# Patient Record
Sex: MALE | Race: ASIAN | NOT HISPANIC OR LATINO | Employment: FULL TIME | URBAN - METROPOLITAN AREA
[De-identification: names, ages, dates, MRNs, and addresses within clinical notes are randomized per-mention and may not be internally consistent; named-entity substitution may affect disease eponyms.]

---

## 2020-12-31 ENCOUNTER — HOSPITAL ENCOUNTER (OUTPATIENT)
Dept: CARDIOLOGY | Facility: HOSPITAL | Age: 55
Discharge: HOME | End: 2020-12-31
Attending: INTERNAL MEDICINE

## 2020-12-31 ENCOUNTER — HOSPITAL ENCOUNTER (OUTPATIENT)
Dept: NUCLEAR MEDICINE | Facility: HOSPITAL | Age: 55
Discharge: HOME | End: 2020-12-31
Attending: INTERNAL MEDICINE

## 2020-12-31 DIAGNOSIS — R07.9 CHEST PAIN, UNSPECIFIED TYPE: ICD-10-CM

## 2020-12-31 LAB
CV STRESS CURRENT BP HE: NORMAL
CV STRESS CURRENT HR HE: 102
CV STRESS CURRENT HR HE: 103
CV STRESS CURRENT HR HE: 104
CV STRESS CURRENT HR HE: 107
CV STRESS CURRENT HR HE: 108
CV STRESS CURRENT HR HE: 109
CV STRESS CURRENT HR HE: 113
CV STRESS CURRENT HR HE: 124
CV STRESS CURRENT HR HE: 124
CV STRESS CURRENT HR HE: 126
CV STRESS CURRENT HR HE: 127
CV STRESS CURRENT HR HE: 129
CV STRESS CURRENT HR HE: 129
CV STRESS CURRENT HR HE: 138
CV STRESS CURRENT HR HE: 138
CV STRESS CURRENT HR HE: 143
CV STRESS CURRENT HR HE: 145
CV STRESS CURRENT HR HE: 66
CV STRESS CURRENT HR HE: 76
CV STRESS CURRENT HR HE: 87
CV STRESS CURRENT HR HE: 89
CV STRESS CURRENT HR HE: 89
CV STRESS CURRENT HR HE: 91
CV STRESS CURRENT HR HE: 92
CV STRESS CURRENT HR HE: 92
CV STRESS CURRENT HR HE: 95
CV STRESS CURRENT HR HE: 96
CV STRESS CURRENT HR HE: 99
CV STRESS DEVIATION TIME HE: NORMAL
CV STRESS ECHO PERCENT HR HE: NORMAL
CV STRESS EXERCISE STAGE HE: NORMAL
CV STRESS EXERCISE STAGE REACHED HE: NORMAL
CV STRESS FINAL RESTING BP HE: NORMAL
CV STRESS FINAL RESTING HR HE: 92
CV STRESS MAX HR HE: 146
CV STRESS MAX TREADMILL GRADE HE: 16
CV STRESS MAX TREADMILL SPEED HE: 4.2
CV STRESS PEAK DIA BP HE: NORMAL
CV STRESS PEAK SYS BP HE: NORMAL
CV STRESS PHASE HE: NORMAL
CV STRESS PROTOCOL HE: NORMAL
CV STRESS RESTING PT POSITION HE: NORMAL
CV STRESS RESTING PT POSITION HE: NORMAL
CV STRESS ST DEVIATION AMOUNT HE: NORMAL
CV STRESS ST DEVIATION ELEVATION HE: NORMAL
CV STRESS ST EVELATION AMOUNT HE: NORMAL
CV STRESS TEST TYPE HE: NORMAL
CV STRESS TOTAL STAGE TIME MIN 1 HE: NORMAL
NUC REST EJECTION FRACTION: 50 %
RATE PRESSURE PRODUCT: NORMAL
STRESS ECHO BASELINE DIASTOLIC HE: 86
STRESS ECHO BASELINE HR: 74
STRESS ECHO BASELINE SYSTOLIC BP: 126
STRESS ECHO CALCULATED PERCENT HR: 88 %
STRESS ECHO LAST STRESS DIASTOLIC BP: 86
STRESS ECHO LAST STRESS HR: 143
STRESS ECHO LAST STRESS SYSTOLIC BP: 186
STRESS ECHO POST ESTIMATED WORKLOAD: 11.9
STRESS ECHO POST EXERCISE DUR MIN: 10
STRESS ECHO POST EXERCISE DUR SEC: 11
STRESS ECHO TARGET HR: 166

## 2021-06-16 PROBLEM — I16.9 HYPERTENSIVE CRISIS: Status: ACTIVE | Noted: 2018-01-25

## 2021-06-16 PROBLEM — N28.9 RENAL INSUFFICIENCY: Status: ACTIVE | Noted: 2018-01-25

## 2021-06-16 PROBLEM — N18.30 CKD (CHRONIC KIDNEY DISEASE) STAGE 3, GFR 30-59 ML/MIN (H): Status: ACTIVE | Noted: 2018-01-25

## 2021-06-16 PROBLEM — G45.9 TIA (TRANSIENT ISCHEMIC ATTACK): Status: ACTIVE | Noted: 2018-01-25

## 2021-06-16 PROBLEM — Z86.73 HISTORY OF TIA (TRANSIENT ISCHEMIC ATTACK) AND STROKE: Status: ACTIVE | Noted: 2020-12-26

## 2021-06-16 PROBLEM — R07.9 CHEST PAIN: Status: ACTIVE | Noted: 2020-12-26

## 2023-10-11 ENCOUNTER — APPOINTMENT (OUTPATIENT)
Dept: CT IMAGING | Facility: HOSPITAL | Age: 58
End: 2023-10-11
Attending: STUDENT IN AN ORGANIZED HEALTH CARE EDUCATION/TRAINING PROGRAM
Payer: COMMERCIAL

## 2023-10-11 ENCOUNTER — HOSPITAL ENCOUNTER (INPATIENT)
Facility: HOSPITAL | Age: 58
LOS: 2 days | Discharge: HOME | End: 2023-10-14
Attending: STUDENT IN AN ORGANIZED HEALTH CARE EDUCATION/TRAINING PROGRAM | Admitting: HOSPITALIST
Payer: COMMERCIAL

## 2023-10-11 DIAGNOSIS — I63.9 ACUTE ISCHEMIC STROKE (H): ICD-10-CM

## 2023-10-11 DIAGNOSIS — R53.1 LEFT-SIDED WEAKNESS: ICD-10-CM

## 2023-10-11 LAB
ANION GAP SERPL CALCULATED.3IONS-SCNC: 11 MMOL/L (ref 7–15)
APTT PPP: 27 SECONDS (ref 22–38)
BASO+EOS+MONOS # BLD AUTO: NORMAL 10*3/UL
BASO+EOS+MONOS NFR BLD AUTO: NORMAL %
BASOPHILS # BLD AUTO: 0.1 10E3/UL (ref 0–0.2)
BASOPHILS NFR BLD AUTO: 1 %
BUN SERPL-MCNC: 23.7 MG/DL (ref 6–20)
CALCIUM SERPL-MCNC: 9.8 MG/DL (ref 8.6–10)
CHLORIDE SERPL-SCNC: 102 MMOL/L (ref 98–107)
CREAT SERPL-MCNC: 1.45 MG/DL (ref 0.67–1.17)
DEPRECATED HCO3 PLAS-SCNC: 27 MMOL/L (ref 22–29)
EGFRCR SERPLBLD CKD-EPI 2021: 56 ML/MIN/1.73M2
EOSINOPHIL # BLD AUTO: 0.2 10E3/UL (ref 0–0.7)
EOSINOPHIL NFR BLD AUTO: 2 %
ERYTHROCYTE [DISTWIDTH] IN BLOOD BY AUTOMATED COUNT: 12.6 % (ref 10–15)
GLUCOSE BLDC GLUCOMTR-MCNC: 121 MG/DL (ref 70–99)
GLUCOSE SERPL-MCNC: 118 MG/DL (ref 70–99)
HCT VFR BLD AUTO: 46 % (ref 40–53)
HGB BLD-MCNC: 15.9 G/DL (ref 13.3–17.7)
IMM GRANULOCYTES # BLD: 0 10E3/UL
IMM GRANULOCYTES NFR BLD: 0 %
INR PPP: 0.91 (ref 0.85–1.15)
LYMPHOCYTES # BLD AUTO: 3.1 10E3/UL (ref 0.8–5.3)
LYMPHOCYTES NFR BLD AUTO: 37 %
MCH RBC QN AUTO: 30.2 PG (ref 26.5–33)
MCHC RBC AUTO-ENTMCNC: 34.6 G/DL (ref 31.5–36.5)
MCV RBC AUTO: 87 FL (ref 78–100)
MONOCYTES # BLD AUTO: 0.7 10E3/UL (ref 0–1.3)
MONOCYTES NFR BLD AUTO: 8 %
NEUTROPHILS # BLD AUTO: 4.5 10E3/UL (ref 1.6–8.3)
NEUTROPHILS NFR BLD AUTO: 52 %
NRBC # BLD AUTO: 0 10E3/UL
NRBC BLD AUTO-RTO: 0 /100
PLATELET # BLD AUTO: 214 10E3/UL (ref 150–450)
POTASSIUM SERPL-SCNC: 3.7 MMOL/L (ref 3.4–5.3)
RBC # BLD AUTO: 5.27 10E6/UL (ref 4.4–5.9)
SODIUM SERPL-SCNC: 140 MMOL/L (ref 135–145)
TROPONIN T SERPL HS-MCNC: <6 NG/L
WBC # BLD AUTO: 8.4 10E3/UL (ref 4–11)

## 2023-10-11 PROCEDURE — 3E03317 INTRODUCTION OF OTHER THROMBOLYTIC INTO PERIPHERAL VEIN, PERCUTANEOUS APPROACH: ICD-10-PCS | Performed by: PSYCHIATRY & NEUROLOGY

## 2023-10-11 PROCEDURE — 93005 ELECTROCARDIOGRAM TRACING: CPT | Performed by: STUDENT IN AN ORGANIZED HEALTH CARE EDUCATION/TRAINING PROGRAM

## 2023-10-11 PROCEDURE — 70498 CT ANGIOGRAPHY NECK: CPT

## 2023-10-11 PROCEDURE — 85730 THROMBOPLASTIN TIME PARTIAL: CPT | Performed by: STUDENT IN AN ORGANIZED HEALTH CARE EDUCATION/TRAINING PROGRAM

## 2023-10-11 PROCEDURE — 84100 ASSAY OF PHOSPHORUS: CPT | Performed by: HOSPITALIST

## 2023-10-11 PROCEDURE — 85025 COMPLETE CBC W/AUTO DIFF WBC: CPT | Performed by: STUDENT IN AN ORGANIZED HEALTH CARE EDUCATION/TRAINING PROGRAM

## 2023-10-11 PROCEDURE — 250N000011 HC RX IP 250 OP 636: Mod: JZ | Performed by: STUDENT IN AN ORGANIZED HEALTH CARE EDUCATION/TRAINING PROGRAM

## 2023-10-11 PROCEDURE — 85610 PROTHROMBIN TIME: CPT | Performed by: STUDENT IN AN ORGANIZED HEALTH CARE EDUCATION/TRAINING PROGRAM

## 2023-10-11 PROCEDURE — 83036 HEMOGLOBIN GLYCOSYLATED A1C: CPT | Performed by: HOSPITALIST

## 2023-10-11 PROCEDURE — 36415 COLL VENOUS BLD VENIPUNCTURE: CPT | Performed by: STUDENT IN AN ORGANIZED HEALTH CARE EDUCATION/TRAINING PROGRAM

## 2023-10-11 PROCEDURE — 80061 LIPID PANEL: CPT | Performed by: HOSPITALIST

## 2023-10-11 PROCEDURE — 82962 GLUCOSE BLOOD TEST: CPT

## 2023-10-11 PROCEDURE — 83735 ASSAY OF MAGNESIUM: CPT | Performed by: HOSPITALIST

## 2023-10-11 PROCEDURE — 80048 BASIC METABOLIC PNL TOTAL CA: CPT | Performed by: STUDENT IN AN ORGANIZED HEALTH CARE EDUCATION/TRAINING PROGRAM

## 2023-10-11 PROCEDURE — 84484 ASSAY OF TROPONIN QUANT: CPT | Performed by: STUDENT IN AN ORGANIZED HEALTH CARE EDUCATION/TRAINING PROGRAM

## 2023-10-11 RX ORDER — IOPAMIDOL 755 MG/ML
75 INJECTION, SOLUTION INTRAVASCULAR ONCE
Status: COMPLETED | OUTPATIENT
Start: 2023-10-11 | End: 2023-10-11

## 2023-10-11 RX ADMIN — IOPAMIDOL 75 ML: 755 INJECTION, SOLUTION INTRAVENOUS at 23:34

## 2023-10-11 NOTE — LETTER
[facility]  [address] [address]  Phone: [phone]  Fax: [phone]    October 14, 2023        Fredy Garza  [address] [address]          To whom it may concern:    RE: Fredy Garza    Patients daughter was present 10/12 &10/13 while patient was hospitalized and missed work.      Please contact me for questions or concerns.      Sincerely,    Trudy Hallman MD  [facility] medicine service

## 2023-10-12 ENCOUNTER — APPOINTMENT (OUTPATIENT)
Dept: OCCUPATIONAL THERAPY | Facility: HOSPITAL | Age: 58
End: 2023-10-12
Attending: HOSPITALIST
Payer: COMMERCIAL

## 2023-10-12 ENCOUNTER — APPOINTMENT (OUTPATIENT)
Dept: CARDIOLOGY | Facility: HOSPITAL | Age: 58
End: 2023-10-12
Attending: HOSPITALIST
Payer: COMMERCIAL

## 2023-10-12 ENCOUNTER — APPOINTMENT (OUTPATIENT)
Dept: MRI IMAGING | Facility: HOSPITAL | Age: 58
End: 2023-10-12
Attending: HOSPITALIST
Payer: COMMERCIAL

## 2023-10-12 PROBLEM — I63.9 ACUTE ISCHEMIC STROKE (H): Status: ACTIVE | Noted: 2023-10-12

## 2023-10-12 PROBLEM — R53.1 LEFT-SIDED WEAKNESS: Status: ACTIVE | Noted: 2023-10-12

## 2023-10-12 LAB
ANION GAP SERPL CALCULATED.3IONS-SCNC: 12 MMOL/L (ref 7–15)
APTT PPP: 30 SECONDS (ref 22–38)
BUN SERPL-MCNC: 20.9 MG/DL (ref 6–20)
CALCIUM SERPL-MCNC: 9.6 MG/DL (ref 8.6–10)
CHLORIDE SERPL-SCNC: 104 MMOL/L (ref 98–107)
CHOLEST SERPL-MCNC: 270 MG/DL
CREAT SERPL-MCNC: 1.2 MG/DL (ref 0.67–1.17)
DEPRECATED HCO3 PLAS-SCNC: 23 MMOL/L (ref 22–29)
EGFRCR SERPLBLD CKD-EPI 2021: 71 ML/MIN/1.73M2
ERYTHROCYTE [DISTWIDTH] IN BLOOD BY AUTOMATED COUNT: 12.4 % (ref 10–15)
FLUAV RNA SPEC QL NAA+PROBE: NEGATIVE
FLUBV RNA RESP QL NAA+PROBE: NEGATIVE
GLUCOSE SERPL-MCNC: 110 MG/DL (ref 70–99)
HBA1C MFR BLD: 6.1 %
HCT VFR BLD AUTO: 46 % (ref 40–53)
HDLC SERPL-MCNC: 53 MG/DL
HGB BLD-MCNC: 15.6 G/DL (ref 13.3–17.7)
HOLD SPECIMEN: NORMAL
INR PPP: 0.92 (ref 0.85–1.15)
LDLC SERPL CALC-MCNC: 179 MG/DL
LVEF ECHO: NORMAL
MAGNESIUM SERPL-MCNC: 2.2 MG/DL (ref 1.7–2.3)
MCH RBC QN AUTO: 30.2 PG (ref 26.5–33)
MCHC RBC AUTO-ENTMCNC: 33.9 G/DL (ref 31.5–36.5)
MCV RBC AUTO: 89 FL (ref 78–100)
NONHDLC SERPL-MCNC: 217 MG/DL
PHOSPHATE SERPL-MCNC: 3 MG/DL (ref 2.5–4.5)
PLATELET # BLD AUTO: 202 10E3/UL (ref 150–450)
POTASSIUM SERPL-SCNC: 3.9 MMOL/L (ref 3.4–5.3)
RBC # BLD AUTO: 5.17 10E6/UL (ref 4.4–5.9)
RSV RNA SPEC NAA+PROBE: NEGATIVE
SARS-COV-2 RNA RESP QL NAA+PROBE: NEGATIVE
SODIUM SERPL-SCNC: 139 MMOL/L (ref 135–145)
TRIGL SERPL-MCNC: 189 MG/DL
WBC # BLD AUTO: 6.9 10E3/UL (ref 4–11)

## 2023-10-12 PROCEDURE — 70551 MRI BRAIN STEM W/O DYE: CPT

## 2023-10-12 PROCEDURE — 97535 SELF CARE MNGMENT TRAINING: CPT | Mod: GO

## 2023-10-12 PROCEDURE — 250N000013 HC RX MED GY IP 250 OP 250 PS 637: Performed by: INTERNAL MEDICINE

## 2023-10-12 PROCEDURE — 80048 BASIC METABOLIC PNL TOTAL CA: CPT | Performed by: HOSPITALIST

## 2023-10-12 PROCEDURE — 85027 COMPLETE CBC AUTOMATED: CPT | Performed by: HOSPITALIST

## 2023-10-12 PROCEDURE — 85610 PROTHROMBIN TIME: CPT | Performed by: HOSPITALIST

## 2023-10-12 PROCEDURE — 99207 PR NO BILLABLE SERVICE THIS VISIT: CPT | Performed by: INTERNAL MEDICINE

## 2023-10-12 PROCEDURE — 200N000001 HC R&B ICU

## 2023-10-12 PROCEDURE — 999N000226 HC STATISTIC SLP IP EVAL DEFER

## 2023-10-12 PROCEDURE — 97165 OT EVAL LOW COMPLEX 30 MIN: CPT | Mod: GO

## 2023-10-12 PROCEDURE — 99291 CRITICAL CARE FIRST HOUR: CPT | Mod: G0 | Performed by: PSYCHIATRY & NEUROLOGY

## 2023-10-12 PROCEDURE — 87637 SARSCOV2&INF A&B&RSV AMP PRB: CPT | Performed by: HOSPITALIST

## 2023-10-12 PROCEDURE — 999N000208 ECHOCARDIOGRAM COMPLETE

## 2023-10-12 PROCEDURE — 250N000013 HC RX MED GY IP 250 OP 250 PS 637: Performed by: HOSPITALIST

## 2023-10-12 PROCEDURE — 85730 THROMBOPLASTIN TIME PARTIAL: CPT | Performed by: HOSPITALIST

## 2023-10-12 PROCEDURE — 93306 TTE W/DOPPLER COMPLETE: CPT | Mod: 26 | Performed by: INTERNAL MEDICINE

## 2023-10-12 PROCEDURE — 36415 COLL VENOUS BLD VENIPUNCTURE: CPT | Performed by: HOSPITALIST

## 2023-10-12 PROCEDURE — 250N000011 HC RX IP 250 OP 636: Performed by: HOSPITALIST

## 2023-10-12 PROCEDURE — 99255 IP/OBS CONSLTJ NEW/EST HI 80: CPT | Performed by: PSYCHIATRY & NEUROLOGY

## 2023-10-12 PROCEDURE — 250N000011 HC RX IP 250 OP 636: Performed by: PSYCHIATRY & NEUROLOGY

## 2023-10-12 PROCEDURE — 99222 1ST HOSP IP/OBS MODERATE 55: CPT | Performed by: HOSPITALIST

## 2023-10-12 RX ORDER — FAMOTIDINE 20 MG/1
20 TABLET, FILM COATED ORAL 2 TIMES DAILY
Status: DISCONTINUED | OUTPATIENT
Start: 2023-10-12 | End: 2023-10-14 | Stop reason: HOSPADM

## 2023-10-12 RX ORDER — HYDRALAZINE HYDROCHLORIDE 20 MG/ML
10-20 INJECTION INTRAMUSCULAR; INTRAVENOUS EVERY 30 MIN PRN
Status: DISCONTINUED | OUTPATIENT
Start: 2023-10-12 | End: 2023-10-13

## 2023-10-12 RX ORDER — LABETALOL HYDROCHLORIDE 5 MG/ML
10-20 INJECTION, SOLUTION INTRAVENOUS EVERY 10 MIN PRN
Status: DISCONTINUED | OUTPATIENT
Start: 2023-10-12 | End: 2023-10-13

## 2023-10-12 RX ORDER — SODIUM CHLORIDE AND POTASSIUM CHLORIDE 150; 900 MG/100ML; MG/100ML
INJECTION, SOLUTION INTRAVENOUS CONTINUOUS
Status: DISCONTINUED | OUTPATIENT
Start: 2023-10-12 | End: 2023-10-12

## 2023-10-12 RX ORDER — ACETAMINOPHEN 325 MG/1
650 TABLET ORAL EVERY 4 HOURS PRN
Status: DISCONTINUED | OUTPATIENT
Start: 2023-10-12 | End: 2023-10-14 | Stop reason: HOSPADM

## 2023-10-12 RX ORDER — LIDOCAINE 40 MG/G
CREAM TOPICAL
Status: DISCONTINUED | OUTPATIENT
Start: 2023-10-12 | End: 2023-10-14 | Stop reason: HOSPADM

## 2023-10-12 RX ORDER — LABETALOL HYDROCHLORIDE 5 MG/ML
10 INJECTION, SOLUTION INTRAVENOUS EVERY 10 MIN PRN
Status: DISCONTINUED | OUTPATIENT
Start: 2023-10-12 | End: 2023-10-12

## 2023-10-12 RX ORDER — FAMOTIDINE 40 MG/5ML
20 POWDER, FOR SUSPENSION ORAL 2 TIMES DAILY
Status: DISCONTINUED | OUTPATIENT
Start: 2023-10-12 | End: 2023-10-14 | Stop reason: HOSPADM

## 2023-10-12 RX ORDER — ATORVASTATIN CALCIUM 40 MG/1
80 TABLET, FILM COATED ORAL AT BEDTIME
Status: DISCONTINUED | OUTPATIENT
Start: 2023-10-12 | End: 2023-10-14 | Stop reason: HOSPADM

## 2023-10-12 RX ORDER — ACETAMINOPHEN 650 MG/20.3ML
650 LIQUID ORAL EVERY 4 HOURS PRN
Status: DISCONTINUED | OUTPATIENT
Start: 2023-10-12 | End: 2023-10-14 | Stop reason: HOSPADM

## 2023-10-12 RX ORDER — HYDRALAZINE HYDROCHLORIDE 20 MG/ML
10 INJECTION INTRAMUSCULAR; INTRAVENOUS EVERY 10 MIN PRN
Status: DISCONTINUED | OUTPATIENT
Start: 2023-10-12 | End: 2023-10-12

## 2023-10-12 RX ORDER — SODIUM CHLORIDE 9 MG/ML
INJECTION, SOLUTION INTRAVENOUS CONTINUOUS PRN
Status: DISCONTINUED | OUTPATIENT
Start: 2023-10-12 | End: 2023-10-12

## 2023-10-12 RX ADMIN — HYDRALAZINE HYDROCHLORIDE 20 MG: 20 INJECTION INTRAMUSCULAR; INTRAVENOUS at 18:12

## 2023-10-12 RX ADMIN — NICARDIPINE HYDROCHLORIDE 5 MG/HR: 0.2 INJECTION, SOLUTION INTRAVENOUS at 00:18

## 2023-10-12 RX ADMIN — FAMOTIDINE 20 MG: 20 TABLET ORAL at 20:30

## 2023-10-12 RX ADMIN — FAMOTIDINE 20 MG: 20 TABLET ORAL at 08:29

## 2023-10-12 RX ADMIN — POTASSIUM CHLORIDE AND SODIUM CHLORIDE: 900; 150 INJECTION, SOLUTION INTRAVENOUS at 03:26

## 2023-10-12 RX ADMIN — ATORVASTATIN CALCIUM 80 MG: 40 TABLET, FILM COATED ORAL at 20:30

## 2023-10-12 RX ADMIN — Medication 16 MG: at 00:36

## 2023-10-12 ASSESSMENT — ACTIVITIES OF DAILY LIVING (ADL)
ADLS_ACUITY_SCORE: 24
ADLS_ACUITY_SCORE: 24
ADLS_ACUITY_SCORE: 35
ADLS_ACUITY_SCORE: 24
ADLS_ACUITY_SCORE: 35
DEPENDENT_IADLS:: INDEPENDENT
PREVIOUS_RESPONSIBILITIES: MEAL PREP;HOUSEKEEPING;LAUNDRY;SHOPPING;YARDWORK;MEDICATION MANAGEMENT;FINANCES;DRIVING;WORK
ADLS_ACUITY_SCORE: 35
ADLS_ACUITY_SCORE: 35
ADLS_ACUITY_SCORE: 24

## 2023-10-12 NOTE — PLAN OF CARE
Speech Therapy    Orders received. Chart reviewed and patient discussed with care team.?Writer briefly met with patient at bedside. His speech is clear (i.e., no dysarthria noted) and he seems to be functioning at his cognitive-linguistic baseline. He also passed the nursing dysphagia screen and has been tolerating a diet of Regular textures and thin liquids without clinical s/sx of aspiration. No skilled Speech Therapy needs at this time. Will defer clinical swallow evaluation and speech/language evaluation and complete current order. Please re-consult if new concerns arise.

## 2023-10-12 NOTE — ED TRIAGE NOTES
Triage Assessment (Adult)       Row Name 10/11/23 2319 10/11/23 [id number]       Triage Assessment    Airway WDL -- WDL       Respiratory WDL    Respiratory WDL -- WDL       Skin Circulation/Temperature WDL    Skin Circulation/Temperature WDL -- WDL       Cardiac WDL    Cardiac WDL -- WDL       Peripheral/Neurovascular WDL    Peripheral Neurovascular WDL -- WDL       Cognitive/Neuro/Behavioral WDL    Cognitive/Neuro/Behavioral WDL -- WDL       [place] Coma Scale    Best Eye Response 4-->(E4) spontaneous --    Best Motor Response 6-->(M6) obeys commands --    Best Verbal Response 5-->(V5) oriented --    [place] Coma Scale Score 15 --

## 2023-10-12 NOTE — ED NOTES
[facility]    Stroke Consult Note    Reason for Consult: Stroke Code     Chief Complaint: Stroke Symptoms      HPI  Fredy Garza is a 58 year old male with a history of remote TIA on ASA, CKD, HTN on lisinopril.     At 8:30pm on 10/11 pt had new onset feeling of weakness in his legs; he'd been up and walking just prior to the onset of weakness, LSW 8:30PM. Came into ER around 11:30pm. On ER MD exam noted to have predominantly L sided arm weakness, stroke code called. NCCT with chronic/age indeterminate small strokes, CTA without large vessel occlusion.    Saw patient on camera. Had left face/arm/leg weakness, did not score on NIHSS for LUE/LLE drift but the left arm/hand and left leg are clearly weaker than the right side on formal testing of function & patient had difficulty standing up from bed on his own due to LLE weakness. Discussed potential risks & benefits of IV TNK as well as inclusion/exclusion criteria with patient & wife at bedside, answered all questions about potential risks (extended risk/benefit discussion). Patient felt symptoms were disabling and agreed to IV TNK.    Blood pressure was very high (SBP >200, DBP >110), cardene gtt started before TNK administration. Blood pressure at time of TNK admin is 152/94 (not clear if this was updated in computer prior to TNK admin but this was witnessed by this writer on tele/confirmed with ER team). TNK in at 00:37    Imaging Findings  HEAD CT:  1.  Multifocal chronic lacunar infarcts throughout the bilateral basal ganglia, deep white matter, and right thalamus. Age-indeterminate infarct in the right corona radiata, new from the prior MRI.  2.  No acute intracranial hemorrhage.  3.  Generalized volume loss and sequelae of chronic microvascular ischemic disease, as described.     HEAD CTA:   1.  Intracranial atherosclerosis, without evidence of a proximal arterial occlusion.     NECK CTA:  1.  Nonflow limiting atherosclerotic  "plaque at the carotid bifurcations.  2.  Dilatation of the ascending aorta, which may reflect an aortic aneurysm. Findings can be further evaluated with CTA of the chest.    Intravenous Thrombolysis  Risks (including potential for bleeding and death), benefits, and alternatives to thrombolytic therapy were discussed with Patient and Family. Prior to tenecteplase (TNK) administration, the following issues were addressed:   - hypertension requiring aggressive control with IV medications  - other eligibility reason: extended discussion of risk/benefit with patient/wife  - in-hospital time delay--waited on phone hold total 7 mins until transferred to ER MD to get clinical story, RN not in room during initial part of tele exam/left room repeatedly which delayed initial exam as well as not in room to receive IV TNK, bed weight not initially obtained needed to be obtained prior to ordering IV TNK    Endovascular Treatment  Not initiated due to absence of proximal vessel occlusion    Impression   Acute ischemic stroke of R hemisphere due to undetermined etiology     Recommendations  - Use orderset: \"Ischemic Stroke Post-Thrombolytics/Thrombectomy ICU Admission\"  - Neurochecks and vital signs per post-thrombolytic orders and monitor closely for any evidence of CNS hemorrhage, bleeding, or orolingual angioedema  - Goal BP <180 / 105  - Hold all antithrombotic and anticoagulant medications for 24 hrs post-thrombolytic  - Hold pharmacologic VTE prophylaxis for 24 hrs post-thrombolytic  - Statin:  atorvastatin 80mg daily adjust for LDL <70 goal  - Repeat Head CT 24 hrs post-thrombolytic  - MRI Brain with and without contrast  - TTE (with Bubble Study if age 60 yrs or less)  - Telemetry, EKG  - Bedside Glucose Monitoring  - A1c, Lipid Panel, Troponin x 3  - PT/OT/SLP  - Stroke Education  - Euthermia, Euglycemia    Patient Follow-up     - final recommendation pending work-up    Thank you for this consult. patient should be " "admitted to [facility] ICU for post-TNK monitoring with Gen Neuro service following. if no [facility] ICU bed he should be transferred to [facility] or [facility] ICU for monitoring and stroke team can be consulted     For additional questions overnight while patient is still in ER please page Dr Johnston (listed as stroke provider covering system in below amcom link)     Ally Hoskins MD  Vascular Neurology    To page me or covering stroke neurology team member, click here: [facility]  Choose \"On Call\" tab at top, then select \"NEUROLOGY/ALL SITES\" from middle drop-down box, press Enter, then look for \"stroke\" or \"telestroke\" for your site.    ______________________________________________________    # CKD, Stage 2 (GFR 60-89): Will monitor and treat as appropriate  - last Cr =  1.45 mg/dL (Ref range: 0.67 - 1.17 mg/dL)  - last GFR = 56 mL/min/1.73m2 (Ref range: >60 mL/min/1.73m2)    Past Medical History   Past Medical History:   Diagnosis Date     HTN (hypertension)      Hyperlipemia      Past Surgical History   Past Surgical History:   Procedure Laterality Date     OTHER SURGICAL HISTORY      no previous     Medications   Home Meds  Prior to Admission medications    Medication Sig Start Date End Date Taking? Authorizing Provider   aspirin 81 MG EC tablet [ASPIRIN 81 MG EC TABLET] Take 1 tablet (81 mg total) by mouth daily. 1/27/18   Randal Hammer DO   atorvastatin (LIPITOR) 80 MG tablet [ATORVASTATIN (LIPITOR) 80 MG TABLET] Take 80 mg by mouth at bedtime. 12/26/20   Provider, Historical   lisinopriL (PRINIVIL,ZESTRIL) 30 MG tablet [LISINOPRIL (PRINIVIL,ZESTRIL) 30 MG TABLET] Take 30 mg by mouth daily. 12/26/20   Provider, Historical   nitroglycerin (NITROSTAT) 0.4 MG SL tablet [NITROGLYCERIN (NITROSTAT) 0.4 MG SL TABLET] Place 0.4 mg under the tongue every 5 (five) minutes as needed for chest pain. 12/26/20   Provider, Historical       Scheduled Meds      Infusion Meds    niCARdipine 7.5 mg/hr (10/12/23 0024)     sodium " chloride         PRN Meds  labetalol **OR** hydrALAZINE, niCARdipine, sodium chloride    Allergies   No Known Allergies  Family History   Family History   Problem Relation Age of Onset     Stomach Cancer Mother      Social History   Social History     Tobacco Use     Smoking status: Never   Substance Use Topics     Alcohol use: No       Review of Systems   The 10 point Review of Systems is negative other than noted in the HPI or here.        PHYSICAL EXAMINATION  Temp:  [97.7  F (36.5  C)] 97.7  F (36.5  C)  Pulse:  [70-91] 91  Resp:  [20-28] 28  BP: (150-221)/() 152/94  SpO2:  [94 %-98 %] 98 %     Neuro Exam  Mental Status:  alert, oriented x 3, follows commands, speech clear and fluent, naming and repetition normal  Cranial Nerves:  visual fields intact (tested by nurse), EOMI with normal smooth pursuit, facial sensation intact and symmetric (tested by nurse), hearing not formally tested but intact to conversation, no dysarthria, shoulder shrug equal bilaterally, tongue protrusion midline, mild L nasolabial fold flattening wife says smile looks the same as normal  Motor:  no abnormal movements, able to move all limbs antigravity spontaneously with no signs of hemiparesis observed, no pronator drift, L arm & leg do not drift but are clearly weaker to confrontational testing  Reflexes:  unable to test (telestroke)  Sensory:  light touch sensation intact and symmetric throughout upper and lower extremities (assessed by nurse), no extinction on double simultaneous stimulation (assessed by nurse)  Coordination:  mild slowing of LUE/LLE to rapid movements, mild LUE dysmetria  Station/Gait:  difficulty standing from bed needs to hold on to support due to LLE weakness    Dysphagia Screen  Per Nursing    Stroke Scales    NIHSS  1a. Level of Consciousness 0-->Alert, keenly responsive   1b. LOC Questions 0-->Answers both questions correctly   1c. LOC Commands 0-->Performs both tasks correctly   2.   Best Gaze  "0-->Normal   3.   Visual 0-->No visual loss   4.   Facial Palsy 1-->Minor paralysis (flattened nasolabial fold, asymmetry on smiling)   5a. Motor Arm, Left 0-->No drift, limb holds 90 (or 45) degrees for full 10 secs   5b. Motor Arm, Right 0-->No drift, limb holds 90 (or 45) degrees for full 10 secs   6a. Motor Leg, Left 0-->No drift, leg holds 30 degree position for full 5 secs   6b. Motor Leg, right 0-->No drift, leg holds 30 degree position for full 5 secs   7.   Limb Ataxia 1-->Present in one limb   8.   Sensory 0-->Normal, no sensory loss   9.   Best Language 0-->No aphasia, normal   10. Dysarthria 0-->Normal   11. Extinction and Inattention  0-->No abnormality   Total 2 (10/11/23 2351)       Modified Edwin Score (Pre-morbid)  0-No deficits    Imaging  I personally reviewed all imaging; relevant findings per HPI.     Lab Results Data   CBC  Recent Labs   Lab 10/11/23  2325   WBC 8.4   RBC 5.27   HGB 15.9   HCT 46.0        Basic Metabolic Panel    Recent Labs   Lab 10/11/23  2325 10/11/23  2316     --    POTASSIUM 3.7  --    CHLORIDE 102  --    CO2 27  --    BUN 23.7*  --    CR 1.45*  --    * 121*   SHERRON 9.8  --      Liver Panel  No results for input(s): \"PROTTOTAL\", \"ALBUMIN\", \"BILITOTAL\", \"ALKPHOS\", \"AST\", \"ALT\", \"BILIDIRECT\" in the last 168 hours.  INR    Recent Labs   Lab Test 10/11/23  2325 12/26/20  0755   INR 0.91 0.93      Lipid Profile    Recent Labs   Lab Test 01/26/18  0541   CHOL 223*   HDL 47   *   TRIG 112     A1C  No lab results found.  Troponin    Recent Labs   Lab 10/11/23  2325   CTROPT <6          Stroke Code Data Data   Stroke Code Data  (for stroke code with tele)  Stroke code activated 10/11/23   5021 (Dr Horn sent page to me to be answered as he was on another call. Per the picture he sent me the page went out to him on Oct 12th at 09:49 ?Dolores time. He texted me at 11:37pm central time, I answered the page at 11:39 central time)   First stroke provider response " 10/11/23   2351   Video start time 10/11/23   2348   Video end time 10/12/23   0040   Last known normal 10/11/23   2030   Time of discovery  (or onset of symptoms)  10/11/23   2030   Head CT read by Stroke Neuro Dr/Provider 10/11/23   2345   Was stroke code de-escalated? No               Telestroke Service Details  Type of service telemedicine diagnostic assessment of acute neurological changes   Reason telemedicine is appropriate patient requires assessment with a specialist for diagnosis and treatment of neurological symptoms   Mode of transmission secure interactive audio and video communication per Steve   Originating site (patient location) [facility]    Distant site (provider location) Provider remote site       I personally examined and evaluated the patient today. At the time of my evaluation and management the patient was in critical condition today due to new onset neurologic symptoms requiring emergent evaluation for acute stroke therapy including IV TNK administration. I personally managed all acute stroke workup and management as above. Key decisions made today included IV TNK administration for presumed acute stroke, initiation of IV cardene gtt for hypertension management before TNK admin. I spent a total of 70 minutes providing critical care services, evaluating the patient, directing care and reviewing laboratory values and radiologic reports.

## 2023-10-12 NOTE — H&P
[facility]    History and Physical - Hospitalist Service       Date of Admission:  10/11/2023    Assessment & Plan      Fredy Garza is a 58 year old male admitted on 10/11/2023. He came to the emergency department for evaluation of left-sided weakness and numbness    #Acute cerebral ischemia  -Status post TNK, symptoms are resolving  -No antiplatelets or anticoagulation x24 hours after TNK  -Check lipid panel, hemoglobin A1c  -Keep systolic blood pressure less than 180, diastolic less than 105  -Telemetry monitoring for arrhythmias, if unable to detect arrhythmia will need 30-day event monitor at discharge  -Echocardiogram to evaluate structure and function for cardioembolic etiology  -PT/OT/SLP evaluation and treatment  -Neurology consult    #Hypertensive emergency  -Noncompliance with blood pressure medications  -Improved on nicardipine drip  -IV labetalol/hydralazine as needed for above parameters    #Hyperglycemia  -Check hemoglobin A1c  -Monitor glucose  -Avoid dextrose    #Hyperlipidemia  -Check lipid panel  -Start statins prior to discharge    #Chronic kidney disease stage IIIa  -Avoid nephrotoxins  -Monitor renal function        Diet: NPO for Medical/Clinical Reasons Except for: Meds    DVT Prophylaxis: Pneumatic Compression Devices  Aquino Catheter: Not present  Lines: None     Cardiac Monitoring: None  Code Status:  Full code        Disposition Plan      Expected Discharge Date: 10/14/2023                  Floyd Spencer MD  Hospitalist Service  [facility]  Securely message with [facility] (more info)  Text page via [facility] Paging/Directory     ______________________________________________________________________    Chief Complaint   Left-sided weakness and numbness    History is obtained from the patient, electronic health record, and emergency department physician    History of Present Illness   Fredy Garza is a 58 year old male who came to the emergency  department for evaluation of left-sided weakness and numbness.  Past medical history of hypertension, hyperlipidemia, CKD.  Patient does not take prescribed medications on a regular basis.  He denies tobacco, alcohol or drugs.  On 10/11/2023 around 8:30 PM he noticed new onset of left-sided weakness and numbness.  The symptoms did not change and came to the ED.  He denies recent falls, head trauma, lightheadedness, fevers, chills, sore throat, cough, chest pain, shortness of breath or palpitations.  Tier 1 stroke code done in the ED, patient was treated with TNK.  His symptoms are mostly resolved at this time.      Past Medical History    Past Medical History:   Diagnosis Date    HTN (hypertension)     Hyperlipemia        Past Surgical History   Past Surgical History:   Procedure Laterality Date    OTHER SURGICAL HISTORY      no previous       Prior to Admission Medications   Prior to Admission Medications   Prescriptions Last Dose Informant Patient Reported? Taking?   aspirin 81 MG EC tablet   No No   Sig: [ASPIRIN 81 MG EC TABLET] Take 1 tablet (81 mg total) by mouth daily.   atorvastatin (LIPITOR) 80 MG tablet   Yes No   Sig: [ATORVASTATIN (LIPITOR) 80 MG TABLET] Take 80 mg by mouth at bedtime.   lisinopriL (PRINIVIL,ZESTRIL) 30 MG tablet   Yes No   Sig: [LISINOPRIL (PRINIVIL,ZESTRIL) 30 MG TABLET] Take 30 mg by mouth daily.   nitroglycerin (NITROSTAT) 0.4 MG SL tablet   Yes No   Sig: [NITROGLYCERIN (NITROSTAT) 0.4 MG SL TABLET] Place 0.4 mg under the tongue every 5 (five) minutes as needed for chest pain.      Facility-Administered Medications: None           Physical Exam   Vital Signs: Temp: 97.7  F (36.5  C) Temp src: Oral BP: 120/75 Pulse: 78   Resp: 30 SpO2: 91 % O2 Device: None (Room air)    Weight: 141 lbs 8 oz    Constitutional: awake and alert  Eyes: pupils equal, round and reactive to light and extra-ocular muscles intact  ENT: normocepalic, without obvious abnormality, oral pharynx with moist mucus  membranes  Respiratory: no increased work of breathing and good air exchange  Cardiovascular: regular rate and rhythm, normal S1 and S2, no murmur noted, and carotids without bruits bilaterally  GI: normal bowel sounds and soft  Skin: no bruising or bleeding  Musculoskeletal: no lower extremity pitting edema present  Neurologic: Mental Status Exam:  Level of Alertness:   awake  Orientation:   person, place, time  Cranial Nerves:  cranial nerves II-XII are grossly intact  Motor Exam:  moves all extremities well and symmetrically, very subtle left upper extremity weakness when compared to the right (patient is right-handed)    Medical Decision Making       55 MINUTES SPENT BY ME on the date of service doing chart review, history, exam, documentation & further activities per the note.  MANAGEMENT DISCUSSED with the following over the past 24 hours: Patient and wife       Data     I have personally reviewed the following data over the past 24 hrs:    8.4  \   15.9   / 214     140 102 23.7 (H) /  118 (H)   3.7 27 1.45 (H) \     Trop: <6 BNP: N/A     INR:  0.91 PTT:  27   D-dimer:  N/A Fibrinogen:  N/A       Imaging results reviewed over the past 24 hrs:   Recent Results (from the past 24 hour(s))   CTA Head Neck with Contrast    Narrative    EXAM: CTA HEAD NECK W CONTRAST  LOCATION: [facility]  DATE: 10/11/2023    INDICATION: Neuro deficit. Left arm and bilateral lower extremity weakness.  COMPARISON: Brain MRI dated 01/25/2018  CONTRAST: Isovue 370 75 ml.  TECHNIQUE: Head and neck CT angiogram with IV contrast. Noncontrast head CT followed by axial helical CT images of the head and neck vessels obtained during the arterial phase of intravenous contrast administration. Axial 2D reconstructed images and   multiplanar 3D MIP reconstructed images of the head and neck vessels were performed by the technologist. Dose reduction techniques were used. All stenosis measurements made according to NASCET  criteria unless otherwise specified.    FINDINGS:   NONCONTRAST HEAD CT:   INTRACRANIAL CONTENTS: No intracranial hemorrhage, extraaxial collection, or mass effect.  Multifocal chronic infarcts in the bilateral basal ganglia, right thalamus, and deep white matter. The right periventricular white matter is new, but is   well-defined and favored to be chronic. There is an age-indeterminate lacunar infarct in the right corona radiata that is new from the prior MRI. Moderate presumed chronic small vessel ischemic changes. Mild generalized volume loss. No hydrocephalus.     VISUALIZED ORBITS/SINUSES/MASTOIDS: No intraorbital abnormality. No paranasal sinus mucosal disease. No middle ear or mastoid effusion.    BONES/SOFT TISSUES: No acute abnormality.    HEAD CTA:  ANTERIOR CIRCULATION: Diffuse atherosclerotic calcification in the carotid siphons without flow-limiting stenosis. Mild stenoses throughout the branches of the anterior middle cerebral arteries, consistent with atherosclerosis. No evidence of a proximal   arterial occlusion or saccular aneurysm. Fetal origin of the right posterior cerebral artery from the anterior circulation.    POSTERIOR CIRCULATION: Multifocal atherosclerotic plaque in the vertebral arteries resulting in mild stenosis. The basilar artery is patent. No proximal arterial occlusion. Mild stenoses of the bilateral PCA branches, consistent with atherosclerosis.   Balanced vertebral arteries supply a normal basilar artery.     DURAL VENOUS SINUSES: Expected enhancement of the major dural venous sinuses.    NECK CTA:  RIGHT CAROTID: Atherosclerotic plaque results in less than 50% stenosis in the right ICA. No dissection.    LEFT CAROTID: Atherosclerotic plaque results in less than 50% stenosis in the left ICA. No dissection.    VERTEBRAL ARTERIES: No focal stenosis or dissection. Balanced vertebral arteries.    AORTIC ARCH: Dilatation of the ascending aorta measuring 3.8 cm in diameter, suggestive  of an aortic aneurysm. No flow-limiting stenosis of the proximal great vessels. Mild degenerative changes of the cervical spine without high-grade spinal canal   stenosis. Visualized lung apices are clear.    NONVASCULAR STRUCTURES: Unremarkable.      Impression    IMPRESSION:   HEAD CT:  1.  Multifocal chronic lacunar infarcts throughout the bilateral basal ganglia, deep white matter, and right thalamus. Age-indeterminate infarct in the right corona radiata, new from the prior MRI.  2.  No acute intracranial hemorrhage.  3.  Generalized volume loss and sequelae of chronic microvascular ischemic disease, as described.    HEAD CTA:   1.  Intracranial atherosclerosis, without evidence of a proximal arterial occlusion.    NECK CTA:  1.  Nonflow limiting atherosclerotic plaque at the carotid bifurcations.  2.  Dilatation of the ascending aorta, which may reflect an aortic aneurysm. Findings can be further evaluated with CTA of the chest.    Findings were discussed with Dr. Queen at 2330 hours on 10/11/2023.

## 2023-10-12 NOTE — PROGRESS NOTES
10/12/23 0845   Appointment Info   Signing Clinician's Name / Credentials (OT) Glenda Tripp, OTR/L      Language pt declined    Living Environment   People in Home spouse;child(amelia), adult   Current Living Arrangements house   Home Accessibility stairs to enter home;stairs within home   Number of Stairs, Main Entrance 2   Stair Railings, Main Entrance railings safe and in good condition   Number of Stairs, Within Home, Primary greater than 10 stairs   Stair Railings, Within Home, Primary railings safe and in good condition   Transportation Anticipated car, drives self;family or friend will provide   Living Environment Comments 2 level home.  adult son lives in basement.   Self-Care   Usual Activity Tolerance excellent   Current Activity Tolerance good   Regular Exercise No   Equipment Currently Used at Home none   Fall history within last six months no   Activity/Exercise/Self-Care Comment independent with self cares and moblity without device.  Pt had been exercising regularly in the past but hasn't as of late   Instrumental Activities of Daily Living (IADL)   Previous Responsibilities meal prep;housekeeping;laundry;shopping;yardwork;medication management;finances;driving;work   IADL Comments shares responsibilities with family   General Information   Onset of Illness/Injury or Date of Surgery 10/11/23   Referring Physician Floyd Spencer   Patient/Family Therapy Goal Statement (OT) pt would like to return home and change lifestyle habbits to improve health   Additional Occupational Profile Info/Pertinent History of Current Problem 58 year old male admitted on 10/11/2023. He came to the emergency department for evaluation of left-sided weakness and numbness    Acute cerebral ischemia   Cognitive Status Examination   Affect/Mental Status (Cognitive) WNL   Follows Commands WNL   Visual Perception   Visual Impairment/Limitations WNL   Sensory   Sensory Comments pt reports having some L  side numbness and weakness but has resolved and is WNL   Strength Comprehensive (MMT)   Comment, General Manual Muscle Testing (MMT) Assessment WNL   Bed Mobility   Bed Mobility No deficits identified   Transfers   Transfers bed-chair transfer;toilet transfer   Transfer Skill: Bed to Chair/Chair to Bed   Bed-Chair [place] (Transfers) independent   Toilet Transfer   Type (Toilet Transfer) sit-stand   [place] Level (Toilet Transfer) independent   Balance   Balance Assessment no deficits identified   Activities of Daily Living   BADL Assessment/Intervention lower body dressing;grooming;clothing fastener management   Lower Body Dressing Assessment/Training   [place] Level (Lower Body Dressing) doff;don;shoes/slippers;independent   Clothes Fastener Management   [place] Level (Clothes Fastener Management) clothes fastener management;shoelaces;independent   Grooming Assessment/Training   [place] Level (Grooming) wash face, hands;independent   Clinical Impression   Criteria for Skilled Therapeutic Interventions Met (OT) Yes, treatment indicated   OT Diagnosis reduced functional moblity and stroke awareness education needs   Influenced by the following impairments CVA   OT Problem List-Impairments impacting ADL problems related to;mobility   Assessment of Occupational Performance 1-3 Performance Deficits   Identified Performance Deficits stroke education needs.  stroke managment and progression   Planned Therapy Interventions (OT) home program guidelines;progressive activity/exercise;risk factor education   Clinical Decision Making Complexity (OT) problem focused assessment/low complexity   Therapy Frequency (OT) One time eval and treatment   Risk & Benefits of therapy have been explained evaluation/treatment results reviewed;care plan/treatment goals reviewed;risks/benefits reviewed;participants voiced agreement with care plan;participants included;patient   OT Total Evaluation Time   OT Eval, Low  Complexity Minutes (11593) 10   OT Goals   OT Predicted Duration/Target Date for Goal Attainment 10/12/23   OT Goals OT Goal 1   OT: Goal 1 pt will participate in stroke awareness education and acknowledge risk factors for managment of CVA/   OT Discharge Planning   OT Plan stroke education.  fall risk reduction and progression post stroke   OT Discharge Recommendation (DC Rec) home   OT Rationale for DC Rec pt is close to baseline and has good support at home   OT Brief overview of current status independent with moblity.  very minimal residual affects with L side post CVA   Total Session Time   Total Session Time (sum of timed and untimed services) 10

## 2023-10-12 NOTE — ED PROVIDER NOTES
EMERGENCY DEPARTMENT ENCOUNTER       ED Course & Medical Decision Making     10:50 PM I met with the patient.   12:15 AM stroke neuro contacted. Systolic 220. Nicardipine ordered with goal systolic less than 180.  12:18 AM I talked with the stroke neurology and they confirmed goal systolic under 180/105.   12:29 AM I talked to intensivist,  regarding patient.   12:59 AM I talked to hospitalist Dr. Spencer regarding patient.  1:10 AM Patients BP now in the 120s, at 2.5 of Nicardipine, will hold for now     Final Impression  58 year old male presents for evaluation of bilateral leg weakness and numbness that started at about 8:40 PM today.  Patient reports he was in his normal state of health throughout the day today, was watching some YouTube videos and then all of a sudden noticed that his legs felt numb and weak, tried to stand and had difficulty standing due to lower extremity weakness.  Wife had to help him into the car to bring him to the ED.  Patient initially evaluated in triage as possible stroke code, was sitting in wheelchair at the time of evaluation.  Patient does have notable left upper extremity weakness on exam, may have very mild left lower extremity weakness with plantarflexion, though when attempting to stand has more pronounced weakness and is unable to support self without significant assistance.  Patient denies history of strokes.  Patient hypertensive in the 180s-190s at triage, later in the 220s but at time of rooming.  Patient's family member at bedside reports that he frequently does not take her misses his blood pressure medications, not uncommon for him to be fairly hypertensive.  Initial CT does not show any bleed, though does show several chronic lacunar infarcts indicative that patient has had several strokes in the past.  Stroke neurology evaluated patient on camera, after shared decision making with family they consented patient for tenecteplase and tenecteplase was given after  patient's blood pressure was treated with nicardipine.  Initially was on 2.5 mg nicardipine, though pressure dropped to 120 systolic thus nicardipine was paused.  Discussed with intensivist as well as hospitalist.  Will be admitted here at [facility].    Prior to making a final disposition on this patient the results of patient's tests and other diagnostic studies were discussed with the patient. All questions were answered. Patient expressed understanding of the plan and was amenable to it.    Medical Decision Making    History:  Supplemental history from: Wife    Work Up:  Chart documentation includes differential considered and any EKGs or imaging independently interpreted by provider, where specified.    External consultation:  Discussion of management with another provider: Hospitalist, intensivist, stroke neurology    Complicating factors:  Care impacted by chronic illness: Hyperlipidemia  Care affected by social determinants of health: N/A    Disposition considerations: Admit.      Medications   sodium chloride 0.9 % infusion (has no administration in time range)   labetalol (NORMODYNE/TRANDATE) injection 10 mg (has no administration in time range)     Or   hydrALAZINE (APRESOLINE) injection 10 mg (has no administration in time range)   niCARdipine 40 mg in 200 mL NS (CARDENE) infusion (5 mg/hr Intravenous Rate/Dose Change 10/12/23 0047)   iopamidol (ISOVUE-370) solution 75 mL (75 mLs Intravenous $Given 10/11/23 2334)   tenecteplase (TNKase) injection 16 mg (16 mg Intravenous $Given 10/12/23 0036)     New Prescriptions    No medications on file       Final Impression     1. Acute ischemic stroke (H)    2. Left-sided weakness        Critical Care     Performed by: Rg Queen MD  Authorized by: Rg Queen MD                   Total critical care time: 40 minutes  Critical care was necessary to treat or prevent imminent or life-threatening deterioration of the following conditions: Acute  ischemic stroke quiring tenecteplase, hypertensive urgency requiring Cardene drip  Critical care was time spent personally by me on the following activities: development of treatment plan with patient or surrogate, discussions with consultants, examination of patient, evaluation of patient's response to treatment, obtaining history from patient or surrogate, ordering and performing treatments and interventions, ordering and review of laboratory studies, ordering and review of radiographic studies, re-evaluation of patient's condition and monitoring for potential decompensation.  Critical care time was exclusive of separately billable procedures and treating other patients.    Chief Complaint     Chief Complaint   Patient presents with    Stroke Symptoms     Around 845pm pt felt his left leg and left arms are weak and c/o numbness. Noted left facial droop. Blood sugar was 121 and Dr. Coe in Triage. Pt is Tier 1 stoke code and was called.    SAEED Garza is a 58 year old male who presents for evaluation of stroke symptoms.     The patient reports left leg and bilateral extremities weakness around 8:40 PM today. The patient states she was watching you tube when he noticed his legs and arms were weak. He endorses numbness with tingling in his left arm and leg, and noted left facial droop. Patient's wife states he is stubborn.He was weak and needed assistance to get into the car, she helped assist him . No history of stroke or heart attacks. Denies any headache, nausea, vomiting or visual changes. No other complaints right now.     I, Saida Cintron am serving as a scribe to document services personally performed by Dr. Rg Queen MD, based on my observation and the provider's statements to me. I, Dr. Rg Queen MD attest that Saida Cintron is acting in a scribe capacity, has observed my performance of the services and has documented them in accordance with my direction.    Physical Exam     /70    Pulse 83   Temp 97.7  F (36.5  C) (Oral)   Resp 26   Wt 64.2 kg (141 lb 8 oz)   SpO2 90%   BMI 25.88 kg/m    Constitutional: Awake, alert, in no acute distress.  Head: Normocephalic, atraumatic.  ENT: Mucous membranes moist.  Eyes: Conjunctiva normal. PERRL.  Respiratory: Respirations even, unlabored, in no acute respiratory distress.  Cardiovascular: Regular rate and rhythm. Good peripheral perfusion.  GI: Abdomen soft, non-tender.  Musculoskeletal: Moves all 4 extremities continuously.  Integument: Warm, dry.  Neurologic: Alert & oriented x 3. Normal speech, no obvious slurring or dysarthria. Grossly normal sensory function.  Notable left upper extremity weakness, still able to  and flex at the elbow though was moderately weak.  May have just trace weakness of plantarflexion of the left lower extremity.  Right strength grossly normal.    Psychiatric: Normal mood      The patient has stroke symptoms:         ED Stroke specific documentation           NIHSS PDF     Patient last known well time: 8:40 PM    Thrombolytics:   Stroke neurology had shared decision making conversation with family and they ultimately elected for tenecteplase    If treating with thrombolytics: Ensure SBP<180 and DBP<105 prior to treatment with thrombolytics.  Administering thrombolytics after treatment with IV labetalol, hydralazine, or nicardipine is reasonable once BP control is established.    Endovascular Retrieval:  Not initiated due to absence of proximal vessel occlusion    National [employer] Stroke Scale (Baseline)  Time Performed: 10:50 PM     Score    Level of consciousness: (0)   Alert, keenly responsive    LOC questions: (0)   Answers both questions correctly    LOC commands: (0)   Performs both tasks correctly    Best gaze: (0)   Normal    Visual: (0)   No visual loss    Facial palsy: (0)   Normal symmetrical movements    Motor arm (left): (1)   Drift    Motor arm (right): (0)   No drift    Motor leg (left):  (0)   No drift    Motor leg (right): (0)   No drift    Limb ataxia: (0)   Absent    Sensory: (0)   Normal- no sensory loss    Best language: (0)   Normal- no aphasia    Dysarthria: (0)   Normal    Extinction and inattention: (0)   No abnormality        Total Score:  1      Stroke Mimics were considered (including migraine headache, seizure disorder, hypoglycemia (or hyperglycemia), head or spinal trauma, CNS infection, Toxin ingestion and shock state (e.g. sepsis) .    Labs & Imaging       Results for orders placed or performed during the hospital encounter of 10/11/23   CTA Head Neck with Contrast    Impression    IMPRESSION:   HEAD CT:  1.  Multifocal chronic lacunar infarcts throughout the bilateral basal ganglia, deep white matter, and right thalamus. Age-indeterminate infarct in the right corona radiata, new from the prior MRI.  2.  No acute intracranial hemorrhage.  3.  Generalized volume loss and sequelae of chronic microvascular ischemic disease, as described.    HEAD CTA:   1.  Intracranial atherosclerosis, without evidence of a proximal arterial occlusion.    NECK CTA:  1.  Nonflow limiting atherosclerotic plaque at the carotid bifurcations.  2.  Dilatation of the ascending aorta, which may reflect an aortic aneurysm. Findings can be further evaluated with CTA of the chest.    Findings were discussed with Dr. Queen at 2330 hours on 10/11/2023.   Basic metabolic panel   Result Value Ref Range    Sodium 140 135 - 145 mmol/L    Potassium 3.7 3.4 - 5.3 mmol/L    Chloride 102 98 - 107 mmol/L    Carbon Dioxide (CO2) 27 22 - 29 mmol/L    Anion Gap 11 7 - 15 mmol/L    Urea Nitrogen 23.7 (H) 6.0 - 20.0 mg/dL    Creatinine 1.45 (H) 0.67 - 1.17 mg/dL    GFR Estimate 56 (L) >60 mL/min/1.73m2    Calcium 9.8 8.6 - 10.0 mg/dL    Glucose 118 (H) 70 - 99 mg/dL   Result Value Ref Range    INR 0.91 0.85 - 1.15   Partial thromboplastin time   Result Value Ref Range    aPTT 27 22 - 38 Seconds   Result Value Ref Range     Troponin T, High Sensitivity <6 <=22 ng/L   CBC with platelets and differential   Result Value Ref Range    WBC Count 8.4 4.0 - 11.0 10e3/uL    RBC Count 5.27 4.40 - 5.90 10e6/uL    Hemoglobin 15.9 13.3 - 17.7 g/dL    Hematocrit 46.0 40.0 - 53.0 %    MCV 87 78 - 100 fL    MCH 30.2 26.5 - 33.0 pg    MCHC 34.6 31.5 - 36.5 g/dL    RDW 12.6 10.0 - 15.0 %    Platelet Count 214 150 - 450 10e3/uL    % Neutrophils 52 %    % Lymphocytes 37 %    % Monocytes 8 %    Mids % (Monos, Eos, Basos)      % Eosinophils 2 %    % Basophils 1 %    % Immature Granulocytes 0 %    NRBCs per 100 WBC 0 <1 /100    Absolute Neutrophils 4.5 1.6 - 8.3 10e3/uL    Absolute Lymphocytes 3.1 0.8 - 5.3 10e3/uL    Absolute Monocytes 0.7 0.0 - 1.3 10e3/uL    Mids Abs (Monos, Eos, Basos)      Absolute Eosinophils 0.2 0.0 - 0.7 10e3/uL    Absolute Basophils 0.1 0.0 - 0.2 10e3/uL    Absolute Immature Granulocytes 0.0 <=0.4 10e3/uL    Absolute NRBCs 0.0 10e3/uL   Glucose by meter   Result Value Ref Range    GLUCOSE BY METER POCT 121 (H) 70 - 99 mg/dL   Extra Red Top Tube   Result Value Ref Range    Hold Specimen JIC        EKG     EKG reviewed and independently interpreted as below;  Sinus rhythm, rate 74.  .  .  QTc 455.  No STEMI.     Rg Queen MD  10/12/23 0134

## 2023-10-12 NOTE — PROGRESS NOTES
[facility] - ICU    RN Progress Note:            Pertinent Assessments:      Please refer to flowsheet rows for full assessment     Pt alert and oriented x 4, Neuro is intact with NIH score 0. PRN Hydralazine was given once to keep SBP <180/ DBP < 105. Denies pain /discomfort. Will continue with hourly neuro per protocol.

## 2023-10-12 NOTE — PROGRESS NOTES
[facility]    After midnight - Hospitalist Service    Assessment and Plan    Principal Problem:    Left-sided weakness  Active Problems:    Acute ischemic stroke (H)    Patient admitted after midnight, this is follow up visit.   Fredy Garza is a 58 year old old male with h/o He came to the emergency department for evaluation of left-sided weakness and numbness     #Acute cerebral ischemia  -Status post TNK, symptoms resolved, needs to stay in ICU for 24 hrs post TNK  -No antiplatelets or anticoagulation x24 hours after TNK  -Keep systolic blood pressure less than 180, diastolic less than 105  -Telemetry monitoring for arrhythmias, if unable to detect arrhythmia will need 30-day event monitor at discharge  -Echocardiogram to evaluate structure and function for cardioembolic etiology  -PT/OT/SLP evaluation and treatment  -Neurology consult  - MRI pending      #Hypertensive emergency  -Noncompliance with blood pressure medications  -Improved on nicardipine drip  -IV labetalol/hydralazine as needed for above parameters     #Hyperglycemia  -Check hemoglobin A1c  -Monitor glucose  -Avoid dextrose     #Hyperlipidemia  -Check lipid panel  -Start statins prior to discharge     #Chronic kidney disease stage IIIa  -Avoid nephrotoxins  -Monitor renal function  VTE prophylaxis:  Pneumatic Compression Devices  DIET: Orders Placed This Encounter      Combination Diet Regular Diet; Low Saturated Fat Diet    Drains/Lines: none  Weight bearing status: WBAT  Disposition/Barriers to discharge: possibly tomorrow pending neuro and MRI findings   Code Status:Full Code    Subjective:  No complaints     B/P: 112/62, T: 97.9, P: 54, R: 16     PERTINENT LABS  Imaging results reviewed over the past 24 hrs:   Recent Results (from the past 24 hour(s))   CTA Head Neck with Contrast    Narrative    EXAM: CTA HEAD NECK W CONTRAST  LOCATION: [facility]  DATE: 10/11/2023    INDICATION: Neuro deficit.  Left arm and bilateral lower extremity weakness.  COMPARISON: Brain MRI dated 01/25/2018  CONTRAST: Isovue 370 75 ml.  TECHNIQUE: Head and neck CT angiogram with IV contrast. Noncontrast head CT followed by axial helical CT images of the head and neck vessels obtained during the arterial phase of intravenous contrast administration. Axial 2D reconstructed images and   multiplanar 3D MIP reconstructed images of the head and neck vessels were performed by the technologist. Dose reduction techniques were used. All stenosis measurements made according to NASCET criteria unless otherwise specified.    FINDINGS:   NONCONTRAST HEAD CT:   INTRACRANIAL CONTENTS: No intracranial hemorrhage, extraaxial collection, or mass effect.  Multifocal chronic infarcts in the bilateral basal ganglia, right thalamus, and deep white matter. The right periventricular white matter is new, but is   well-defined and favored to be chronic. There is an age-indeterminate lacunar infarct in the right corona radiata that is new from the prior MRI. Moderate presumed chronic small vessel ischemic changes. Mild generalized volume loss. No hydrocephalus.     VISUALIZED ORBITS/SINUSES/MASTOIDS: No intraorbital abnormality. No paranasal sinus mucosal disease. No middle ear or mastoid effusion.    BONES/SOFT TISSUES: No acute abnormality.    HEAD CTA:  ANTERIOR CIRCULATION: Diffuse atherosclerotic calcification in the carotid siphons without flow-limiting stenosis. Mild stenoses throughout the branches of the anterior middle cerebral arteries, consistent with atherosclerosis. No evidence of a proximal   arterial occlusion or saccular aneurysm. Fetal origin of the right posterior cerebral artery from the anterior circulation.    POSTERIOR CIRCULATION: Multifocal atherosclerotic plaque in the vertebral arteries resulting in mild stenosis. The basilar artery is patent. No proximal arterial occlusion. Mild stenoses of the bilateral PCA branches, consistent  with atherosclerosis.   Balanced vertebral arteries supply a normal basilar artery.     DURAL VENOUS SINUSES: Expected enhancement of the major dural venous sinuses.    NECK CTA:  RIGHT CAROTID: Atherosclerotic plaque results in less than 50% stenosis in the right ICA. No dissection.    LEFT CAROTID: Atherosclerotic plaque results in less than 50% stenosis in the left ICA. No dissection.    VERTEBRAL ARTERIES: No focal stenosis or dissection. Balanced vertebral arteries.    AORTIC ARCH: Dilatation of the ascending aorta measuring 3.8 cm in diameter, suggestive of an aortic aneurysm. No flow-limiting stenosis of the proximal great vessels. Mild degenerative changes of the cervical spine without high-grade spinal canal   stenosis. Visualized lung apices are clear.    NONVASCULAR STRUCTURES: Unremarkable.      Impression    IMPRESSION:   HEAD CT:  1.  Multifocal chronic lacunar infarcts throughout the bilateral basal ganglia, deep white matter, and right thalamus. Age-indeterminate infarct in the right corona radiata, new from the prior MRI.  2.  No acute intracranial hemorrhage.  3.  Generalized volume loss and sequelae of chronic microvascular ischemic disease, as described.    HEAD CTA:   1.  Intracranial atherosclerosis, without evidence of a proximal arterial occlusion.    NECK CTA:  1.  Nonflow limiting atherosclerotic plaque at the carotid bifurcations.  2.  Dilatation of the ascending aorta, which may reflect an aortic aneurysm. Findings can be further evaluated with CTA of the chest.    Findings were discussed with Dr. Queen at 2330 hours on 10/11/2023.   Echocardiogram Complete w Bubble study - For age 60 yrs or less   Result Value    LVEF  60-65%    Narrative    [id number]  [id number]  [id number]  [id number]^TEO^OCTAVIAN[place] [address]     Name: TAN SWARTZ  MRN: [id number]  : 1965  Study Date: 10/12/2023 07:29 AM  Age: 58 yrs  Gender: Male  Patient  Location: [facility]  Reason For Study: Cerebrovascular Incident  Ordering Physician: OCTAVIANO BRUCE  Performed By: ROSIE     BSA: 1.6 m2  Height: 62 in  Weight: 138 lb  HR: 61  ______________________________________________________________________________  Procedure  Complete Portable Echo Adult. Complete Portable Bubble Echo Adult.  ______________________________________________________________________________  Interpretation Summary     Normal left ventricular size. Moderate concentric left ventricular  hypertrophy. Left ventricular systolic function is normal. Left ventricular  ejection fraction is estimated at 60 to 65%. No regional wall motion  abnormality noted.  Normal right ventricular size. Possible mild decrease in right ventricular  systolic function with mildly abnormal TAPSE.  Negative echo contrast bubble study at rest and with Valsalva.  No hemodynamically significant valve abnormality.  Mild mitral regurgitation.  The proximal ascending aorta measures borderline enlarged at 3.7 cm.  ______________________________________________________________________________  I      WMSI = 1.00     % Normal = 100     X - Cannot   0 -                      (2) - Mildly 2 -          Segments  Size  Interpret    Hyperkinetic 1 - Normal  Hypokinetic  Hypokinetic  1-2     small                                                     7 -          3-5      moderate  3 - Akinetic 4 -          5 -         6 - Akinetic Dyskinetic   6-14    large               Dyskinetic   Aneurysmal  w/scar       w/scar       15-16   diffuse     Left Ventricle  The left ventricle is normal in size. There is moderate concentric left  ventricular hypertrophy. Diastolic Doppler findings (E/E' ratio and/or other  parameters) suggest left ventricular filling pressures are indeterminate. Left  ventricular systolic function is normal. The visual ejection fraction is 60-  65%. Normal left ventricular wall motion.     Right Ventricle  The right ventricle is  normal size. TAPSE is abnormal, which is consistent  with abnormal right ventricular systolic function. Mildly decreased right  ventricular systolic function.     Atria  Normal left atrial size. Right atrial size is normal. A contrast injection  (Bubble Study) was performed that was negative for flow across the interatrial  septum.     Mitral Valve  Mitral valve leaflets appear normal. There is mild (1+) mitral regurgitation.     Tricuspid Valve  The tricuspid valve is not well visualized, but is grossly normal. There is  trace tricuspid regurgitation. Right ventricular systolic pressure could not  be approximated due to inadequate tricuspid regurgitation.     Aortic Valve  There is mild trileaflet aortic sclerosis. No hemodynamically significant  valvular aortic stenosis.     Pulmonic Valve  The pulmonic valve is not well seen, but is grossly normal. There is trace  pulmonic valvular regurgitation.     Vessels  The aorta root is normal. Proximal ascending aorta measures borderline  enlarged at 3.7 cm. IVC diameter <2.1 cm collapsing >50% with sniff suggests a  normal RA pressure of 3 mmHg.     Pericardium  There is no pericardial effusion.     ______________________________________________________________________________  MMode/2D Measurements & Calculations  IVSd: 1.5 cm  LVIDd: 3.6 cm  LVIDs: 2.6 cm  LVPWd: 1.4 cm  FS: 29.0 %  LV mass(C)d: 192.7 grams  LV mass(C)dI: 118.0 grams/m2  Ao root diam: 3.1 cm  LA dimension: 3.8 cm     asc Aorta Diam: 3.7 cm  LA/Ao: 1.2  LVOT diam: 2.1 cm  LVOT area: 3.5 cm2  Ao root diam index Ht(cm/m): 2.0  Ao root diam index BSA (cm/m2): 1.9  Asc Ao diam index BSA (cm/m2): 2.3  Asc Ao diam index Ht(cm/m): 2.3  LA Volume (BP): 42.7 ml  LA Volume Index (BP): 26.2 ml/m2     LA Volume Indexed (AL/bp): 28.1 ml/m2  RV Base: 4.0 cm  RWT: 0.79  TAPSE: 1.6 cm     Doppler Measurements & Calculations  MV E max huber: 62.9 cm/sec  MV A max huber: 79.7 cm/sec  MV E/A: 0.79  MV max PG: 3.6 mmHg  MV  mean P.0 mmHg  MV V2 VTI: 26.5 cm  MVA(VTI): 2.6 cm2  MV dec slope: 233.0 cm/sec2  MV dec time: 0.27 sec  Ao V2 max: 139.0 cm/sec  Ao max P.0 mmHg  Ao V2 mean: 103.0 cm/sec  Ao mean P.0 mmHg  Ao V2 VTI: 28.8 cm  MURALI(I,D): 2.4 cm2  MURALI(V,D): 2.3 cm2  LV V1 max PG: 3.4 mmHg  LV V1 max: 91.8 cm/sec  LV V1 VTI: 19.6 cm     SV(LVOT): 67.9 ml  SI(LVOT): 41.6 ml/m2  PA V2 max: 88.2 cm/sec  PA max PG: 3.1 mmHg  PA acc time: 0.11 sec  PI end-d omer: 117.0 cm/sec  AV Omer Ratio (DI): 0.66  MURALI Index (cm2/m2): 1.4  E/E' av.4  Lateral E/e': 11.3  Medial E/e': 13.4  RV S Omer: 10.1 cm/sec     ______________________________________________________________________________  Report approved by: Shira Cuellar 10/12/2023 08:33 AM         MR Brain w/o Contrast    Narrative    EXAM: MR BRAIN W/O CONTRAST  LOCATION: [facility]  DATE: 10/12/2023    INDICATION: Acute ischemic stroke  COMPARISON: CTA 10/11/2023. MRI 2018.  TECHNIQUE: Routine multiplanar multisequence head MRI without intravenous contrast.    FINDINGS:  INTRACRANIAL CONTENTS: Diffusion hyperintensity within the left thalamus surrounding a focus of prominent susceptibility signal loss, but without significant corresponding change in ADC values suggesting T2 shine through. This focus of susceptibility   change within the left thalamus has significantly increased in size compared to the 2018 MRI and may reflect a focus of progressive subacute hemorrhage, potentially associated with an underlying cavernoma. Minor adjacent T2/FLAIR hyperintensity. This   area was isodense to adjacent brain parenchyma on CT further confirming an evolving subacute process. No evidence for acute infarct. There is a punctate focus of new susceptibility signal loss within the medial right thalamus that was not seen on the   previous MRI suggesting interval microhemorrhage in this location without associated edema or other findings to suggest acuity.  Additional foci of presumed chronic hemosiderin staining within the left putamen and campbell. Moderate presumed microvascular   ischemic change within the cerebral white matter. Chronic lacunar infarcts of the right thalamus and left greater than right corona radiata/centrum semiovale largely similar to the previous MRI. Mild generalized cerebral atrophy. No hydrocephalus. Normal   position of the cerebellar tonsils.     SELLA: No abnormality accounting for technique.    OSSEOUS STRUCTURES/SOFT TISSUES: Normal marrow signal. The major intracranial vascular flow voids are maintained.     ORBITS: No abnormality accounting for technique.     SINUSES/MASTOIDS: Mild mucosal thickening scattered about the paranasal sinuses. No middle ear or mastoid effusion.       Impression    IMPRESSION:  1.  Focus of susceptibility signal loss within the left thalamus has increased in size compared to the 2018 MRI and may reflect a focus of progressive subacute hemorrhage, potentially associated with an underlying cavernoma.  2.  New punctate focus of susceptibility signal loss within the medial right thalamus without associated edema or other findings to suggest acuity. This may reflect a new focus of chronic microhemorrhage.  3.  Background of generalized brain atrophy and multifocal chronic ischemic changes similar to the previous MRI.  4.  Mild inflammatory changes of the paranasal sinuses.      Recent Labs   Lab 10/12/23  0402 10/11/23  2325 10/11/23  2316   WBC 6.9 8.4  --    HGB 15.6 15.9  --    MCV 89 87  --     214  --    INR 0.92 0.91  --     140  --    POTASSIUM 3.9 3.7  --    CHLORIDE 104 102  --    CO2 23 27  --    BUN 20.9* 23.7*  --    CR 1.20* 1.45*  --    ANIONGAP 12 11  --    SHERRON 9.6 9.8  --    * 118* 121*       Trudy Hallman MD  [facility] Medicine Service  [phone]

## 2023-10-12 NOTE — PROGRESS NOTES
Occupational Therapy Discharge Summary    Reason for therapy discharge:    Discharged to home.    Progress towards therapy goal(s). See goals on Care Plan in [facility] electronic health record for goal details.  Goals met    Therapy recommendation(s):    No further therapy is recommended.Home exercise and risk factor management follow through recommended

## 2023-10-12 NOTE — PHARMACY-ADMISSION MEDICATION HISTORY
"Pharmacist Admission Medication History    Admission medication history is complete. The information provided in this note is only as accurate as the sources available at the time of the update.    Information Source(s): Patient and Clinic records via in-person    Pertinent Information: patient reported taking medications yesterday because he wasn't feeling \"good\" but Rx medication had not been filled since 3/1/23 90 day supply. He did report not taking medications every day    Changes made to PTA medication list:  Added: None  Deleted: None  Changed: None    Medication Affordability:       Allergies reviewed with patient and updates made in EHR: yes    Medication History Completed By: Kenia Brown RPH 10/12/2023 7:52 AM    PTA Med List   Medication Sig Last Dose    aspirin 81 MG EC tablet [ASPIRIN 81 MG EC TABLET] Take 1 tablet (81 mg total) by mouth daily. 10/11/2023    atorvastatin (LIPITOR) 80 MG tablet [ATORVASTATIN (LIPITOR) 80 MG TABLET] Take 80 mg by mouth at bedtime. 10/11/2023    lisinopriL (PRINIVIL,ZESTRIL) 30 MG tablet [LISINOPRIL (PRINIVIL,ZESTRIL) 30 MG TABLET] Take 30 mg by mouth daily. 10/11/2023    nitroglycerin (NITROSTAT) 0.4 MG SL tablet [NITROGLYCERIN (NITROSTAT) 0.4 MG SL TABLET] Place 0.4 mg under the tongue every 5 (five) minutes as needed for chest pain. Unknown at PRN       "

## 2023-10-12 NOTE — CONSULTS
Care Management Initial Consult    General Information  Assessment completed with: Patient, Patient  Type of CM/SW Visit: Initial Assessment    Primary Care Provider verified and updated as needed: Yes   Readmission within the last 30 days:        Reason for Consult: discharge planning  Advance Care Planning: Advance Care Planning Reviewed: verified with patient        Communication Assessment  Patient's communication style: spoken language (English or Bilingual)        Wear Glasses or Blind: yes    Cognitive  Cognitive/Neuro/Behavioral: WDL  Level of Consciousness: alert  Arousal Level: opens eyes spontaneously  Orientation: oriented x 4     Best Language: 0 - No aphasia  Speech: clear, spontaneous, logical    Living Environment:   People in home: child(amelia), adult, spouse     Current living Arrangements: house      Able to return to prior arrangements: yes     Family/Social Support:  Care provided by: self  Provides care for: no one  Marital Status:   Wife  Yulissa       Description of Support System: Supportive, Involved         Current Resources:   Patient receiving home care services:  No     Community Resources:  None  Equipment currently used at home: none  Supplies currently used at home:  None    Employment/Financial:  Employment Status: employed full-time        Financial Concerns:   No     Does the patient's insurance plan have a 3 day qualifying hospital stay waiver?  No    Lifestyle & Psychosocial Needs:  Social Determinants of Health     Food Insecurity: Not on file   Depression: Not on file   Housing Stability: Not on file   Tobacco Use: Unknown (7/11/2021)    Patient History     Smoking Tobacco Use: Never     Smokeless Tobacco Use: Unknown     Passive Exposure: Not on file   Financial Resource Strain: Not on file   Alcohol Use: Not on file   Transportation Needs: Not on file   Physical Activity: Not on file   Interpersonal Safety: Not on file   Stress: Not on file   Social Connections: Not on file        Functional Status:  Prior to admission patient needed assistance:   Dependent ADLs:: Independent  Dependent IADLs:: Independent     Additional Information:  RNCM met with patient at bedside to review role of care management services, discuss goals of care and assess need for any possible services at discharge. Patient alert, answering questions appropriately and engaged in the conversation. Patient does not have a HCD, but requested info. A [place] Hon[place] form was given to patient. Lives with spouse, Yulissa and two adult children in a 2.5 level house. Baseline independent with ADLs/IADLs. Work FT and drive. Ambulatory with no assistive device. No DME or community resources. Goal is to return home. Family will transport.       Ivy Coleman RN

## 2023-10-12 NOTE — CONSULTS
This is a neurological consultation    58-year-old admitted to hospital 10/11/2023    Chief complaint  Bilateral leg weakness and numbness    HPI  58-year-old presented to the hospital 10/11/2023 with bilateral leg weakness and numbness.  Onset of symptoms around 2040, came to the ER at 2309    Had been looking at YouTube videos and had fairly sudden numbness and weakness of his legs  Tried to stand and they were weak    Patient was slightly hypertensive on admission 180s to 220s  CT scan of the head showed no bleed  There were several chronic infarcts that was seen    Stroke code team evaluated patient during presentation  On their eval patient predominantly had left-sided weakness face/arm/leg  CTA with no large vessel occlusion    TNK administered 00: 37 (10/12/2023)      Past medical history  Hypertension  Hyperlipidemia  CVA 2018  CKD stage III    Habits  Non-smoker  Rare alcoholic beverage      Family history  Family history of stomach cancer    Work-up  HEAD CT: 10/11/2023  1.  Multifocal chronic lacunar infarcts throughout the bilateral basal ganglia, deep white matter, and right thalamus.        Age-indeterminate infarct in the right corona radiata, new from the prior MRI.  2.  No acute intracranial hemorrhage.  3.  Generalized volume loss and sequelae of chronic microvascular ischemic disease, as described.  HEAD CTA: 10/11/2023  1.  Intracranial atherosclerosis, without evidence of a proximal arterial occlusion.  NECK CTA: 10/11/2023  1.  Nonflow limiting atherosclerotic plaque at the carotid bifurcations.  2.  Dilatation of the ascending aorta, which may reflect an aortic aneurysm.        Findings can be further evaluated with CTA of the chest.  Cardiac alcohol 10/12/2023, 60-65% ejection fraction, bubble study negative, mild mitral regurg, left atrium normal size      Hemoglobin A1c 6.1%  HDL/LDL 53/179  Troponin on admission <6  SARS/influenza A/influenza B/RSV negative    Laboratory data  Sodium 139  potassium 3.9  BUN 20.9, creatinine 1.2  Glucose 110  WBC 6.9, hemoglobin 15.6, platelet 202,000      Exam  Blood pressure 126/82, pulse 53, temperature 98.0  Blood pressure range 105//109  Pulse range 53-98  Tmax 98.1    Review of system  Pertinent positive negatives  No headache no chest pain no shortness of breath no nausea vomiting no diarrhea no fever chills    No diplopia no dysarthria no dysphagia  No visual changes  Weakness in the legs better maybe a little bit of symptoms on the left leg    Otherwise review systems negative      General exam per primary MD  Awake and alert  HEENT no signs of trauma  Lungs clear  Heart rate regular  Abdomen soft  Symmetrical pulses    Neurologic exam  Alert orient x3  Prosody speech normal  Naming normal  Comprehension normal  Repetition normal  No aphasia  No neglect  Memory recall okay    Cranial nerves II through XII  No ophthalmoplegia  No nystagmus  No visual field cut  Face symmetrical  Tongue twisters good    Upper extremities  No drift  Rapid alternating movements actually seem a pinch slower on the right but subtle    Lower extremities  Lift off the bed okay wiggles the toes seem relatively symmetrical  When I push down on the legs when he lifts them up he feels that the right leg is slightly stronger than the left    Gait  Deferred        Assessment/plan    Acute bilateral leg weakness and numbness (exam left face arm and leg weakness)    Treated with TNK         TNK administered 00: 37 (10/12/2023)    3.   Vascular risk factors        Hypertension/hyperlipidemia/previous CVA/CKD    Diagnosis  CVA status post TNK treatment  Hypertensive emergency      Patient will need  MRI scan of the head    Follow-up CT head to rule out post thrombotic hemorrhage can be done tomorrow 10/13/2023  No antiplatelet medication until tomorrow 10/12/2023  Patient was supposed to be on aspirin 81 mg once per day but admits that he probably does miss that a lot      Post TNK  orders  Start antiplatelet agents greater than 24 hours after the event if no hemorrhage    Discussed with nursing staff face-to-face    Total care time today 80 minutes

## 2023-10-12 NOTE — ED TRIAGE NOTES
Around 845pm pt felt his left leg and left arms are weak and c/o numbness. Noted left facial droop. Blood sugar was 121 and Dr. Coe in Triage. Pt is Tier 1 stroke code and was called and pt went to CT right away.     Triage Assessment (Adult)       Row Name 10/11/23 5941          Triage Assessment    Airway WDL WDL        Respiratory WDL    Respiratory WDL WDL        Skin Circulation/Temperature WDL    Skin Circulation/Temperature WDL WDL        Cardiac WDL    Cardiac WDL WDL        Peripheral/Neurovascular WDL    Peripheral Neurovascular WDL WDL        Cognitive/Neuro/Behavioral WDL    Cognitive/Neuro/Behavioral WDL WDL

## 2023-10-12 NOTE — PLAN OF CARE
Goal Outcome Evaluation:         [facility] - ICU    RN Progress Note:            Pertinent Assessments:      Please refer to flowsheet rows for full assessment     Afebrile, Pt is currently 30 mins neuro check until 8:30am then Q1 and is intact,patient denies pain and any new symptoms of TNK. Patient L sided weakness has resolved following receiving TNK.            Key Events - This Shift:       Refer to note above.               Barriers to Discharge / Downgrade:

## 2023-10-12 NOTE — PROGRESS NOTES
Physical Therapy: Orders received. Chart reviewed and discussed with care team.? Physical Therapy not indicated due to per O T  evaluation patient symptoms resolved and he is independent with mobility and ambulation with no needs for skilled PT.? Defer discharge recommendations to treatment team.? Will complete orders.

## 2023-10-13 ENCOUNTER — APPOINTMENT (OUTPATIENT)
Dept: CT IMAGING | Facility: HOSPITAL | Age: 58
End: 2023-10-13
Attending: PSYCHIATRY & NEUROLOGY
Payer: COMMERCIAL

## 2023-10-13 PROBLEM — I16.1 HYPERTENSIVE EMERGENCY: Status: ACTIVE | Noted: 2023-10-13

## 2023-10-13 LAB
ATRIAL RATE - MUSE: 74 BPM
DIASTOLIC BLOOD PRESSURE - MUSE: 115 MMHG
GLUCOSE BLDC GLUCOMTR-MCNC: 110 MG/DL (ref 70–99)
INTERPRETATION ECG - MUSE: NORMAL
P AXIS - MUSE: 70 DEGREES
PR INTERVAL - MUSE: 186 MS
QRS DURATION - MUSE: 102 MS
QT - MUSE: 410 MS
QTC - MUSE: 455 MS
R AXIS - MUSE: -20 DEGREES
SYSTOLIC BLOOD PRESSURE - MUSE: 197 MMHG
T AXIS - MUSE: -7 DEGREES
VENTRICULAR RATE- MUSE: 74 BPM

## 2023-10-13 PROCEDURE — 99233 SBSQ HOSP IP/OBS HIGH 50: CPT | Performed by: PSYCHIATRY & NEUROLOGY

## 2023-10-13 PROCEDURE — 120N000001 HC R&B MED SURG/OB

## 2023-10-13 PROCEDURE — 250N000011 HC RX IP 250 OP 636: Mod: JZ | Performed by: HOSPITALIST

## 2023-10-13 PROCEDURE — 250N000013 HC RX MED GY IP 250 OP 250 PS 637: Performed by: INTERNAL MEDICINE

## 2023-10-13 PROCEDURE — 99233 SBSQ HOSP IP/OBS HIGH 50: CPT | Performed by: INTERNAL MEDICINE

## 2023-10-13 PROCEDURE — 70450 CT HEAD/BRAIN W/O DYE: CPT

## 2023-10-13 RX ORDER — HYDRALAZINE HYDROCHLORIDE 20 MG/ML
10-20 INJECTION INTRAMUSCULAR; INTRAVENOUS EVERY 30 MIN PRN
Status: DISCONTINUED | OUTPATIENT
Start: 2023-10-13 | End: 2023-10-14 | Stop reason: HOSPADM

## 2023-10-13 RX ADMIN — LISINOPRIL 30 MG: 20 TABLET ORAL at 10:26

## 2023-10-13 RX ADMIN — HYDRALAZINE HYDROCHLORIDE 10 MG: 20 INJECTION INTRAMUSCULAR; INTRAVENOUS at 12:42

## 2023-10-13 RX ADMIN — ACETAMINOPHEN 650 MG: 325 TABLET ORAL at 14:26

## 2023-10-13 RX ADMIN — ATORVASTATIN CALCIUM 80 MG: 40 TABLET, FILM COATED ORAL at 22:22

## 2023-10-13 RX ADMIN — FAMOTIDINE 20 MG: 20 TABLET ORAL at 22:22

## 2023-10-13 ASSESSMENT — ACTIVITIES OF DAILY LIVING (ADL)
ADLS_ACUITY_SCORE: 24
ADLS_ACUITY_SCORE: 24
ADLS_ACUITY_SCORE: 21
ADLS_ACUITY_SCORE: 20
ADLS_ACUITY_SCORE: 24
ADLS_ACUITY_SCORE: 21
ADLS_ACUITY_SCORE: 20
ADLS_ACUITY_SCORE: 21
ADLS_ACUITY_SCORE: 24
ADLS_ACUITY_SCORE: 20
ADLS_ACUITY_SCORE: 25
ADLS_ACUITY_SCORE: 24

## 2023-10-13 NOTE — CARE PLAN
PRIMARY Concern: Left sided weakness, stroke monitoring  SAFETY RISK Concerns (fall risk, behaviors, etc.): independent      Tests/Procedures for NEXT shift: CT scan 10/14/23  Consults? (Pending/following, signed-off?) Neuro  Where is patient from? (Home, TCU, etc.): Home  Other Important info for NEXT shift: continue NIH  Anticipated DC date & active delays: discharge 10/14/23 pending imaging  _____________________________________________________________  Orientation/Cognitive: A&O x4  Mobility Level/Assist Equipment: Independent  Antibiotics & Plan (IV/po, length of tx left): none  Pain Management: reported 4/10 HA, tylenol given  Tele/VS/O2: VSS 98% on RA  Diet: low fat  Bowel/Bladder: continent, last BM 10/13  Skin Concerns: none  Drains/Devices: R and L PIV

## 2023-10-13 NOTE — PLAN OF CARE
Goal Outcome Evaluation:  [facility] - ICU    RN Progress Note:            Pertinent Assessments:      Please refer to flowsheet rows for full assessment     Alert & Oriented x4, Denies pains, Neuro is intact with NIHS at 0 ON, VSS.           Key Events - This Shift:       Refer note above.               Barriers to Discharge / Downgrade:

## 2023-10-13 NOTE — PROGRESS NOTES
This is a neurological follow-up note    58-year-old admitted to hospital 10/11/2023    Chief complaint  Bilateral leg weakness and numbness    HPI  58-year-old presented to the hospital 10/11/2023 with bilateral leg weakness and numbness.  Onset of symptoms around 2040, came to the ER at 2309    Had been looking at YouTube videos and had fairly sudden numbness and weakness of his legs  Tried to stand and they were weak    Patient was slightly hypertensive on admission 180s to 220s  CT scan of the head showed no bleed  There were several chronic infarcts that was seen    Stroke code team evaluated patient during presentation  On their eval patient predominantly had left-sided weakness face/arm/leg  CTA with no large vessel occlusion    TNK administered 00: 37 (10/12/2023)      Past medical history  Hypertension  Hyperlipidemia  CVA 2018  CKD stage III    Habits  Non-smoker  Rare alcoholic beverage      Family history  Family history of stomach cancer    Work-up  HEAD CT: 10/11/2023  1.  Multifocal chronic lacunar infarcts throughout the bilateral basal ganglia, deep white matter, and right thalamus.        Age-indeterminate infarct in the right corona radiata, new from the prior MRI.  2.  No acute intracranial hemorrhage.  3.  Generalized volume loss and sequelae of chronic microvascular ischemic disease, as described.  HEAD CTA: 10/11/2023  1.  Intracranial atherosclerosis, without evidence of a proximal arterial occlusion.  NECK CTA: 10/11/2023  1.  Nonflow limiting atherosclerotic plaque at the carotid bifurcations.  2.  Dilatation of the ascending aorta, which may reflect an aortic aneurysm.        Findings can be further evaluated with CTA of the chest.  Cardiac echo 10/12/2023, 60-65% ejection fraction, bubble study negative, mild mitral regurg, left atrium normal size  MRI head 10/12/2023  1.  Focus of susceptibility signal loss within the left thalamus has increased in size compared to the 2018 MRI       May  reflect a focus of progressive subacute hemorrhage, potentially associated with an underlying cavernoma.  2.  New punctate focus of susceptibility signal loss within the medial right thalamus without associated edema or other findings to suggest acuity. This may reflect a new focus of chronic microhemorrhage.  3.  Background of generalized brain atrophy and multifocal chronic ischemic changes similar to the previous MRI.  4.  Mild inflammatory changes of the paranasal sinuses.     Hemoglobin A1c 6.1%  HDL/LDL 53/179  Troponin on admission <6  SARS/influenza A/influenza B/RSV negative    CT HEAD 10/13/2023  1.  There is a small focus of hemorrhage in the left thalamus that is new from comparison CT imaging dated 10/11/2023.  2.  Multiple small chronic infarcts involving the right thalamus and bilateral corona radiata.      Laboratory data  Sodium 139 potassium 3.9  BUN 20.9, creatinine 1.2  Glucose 110  WBC 6.9, hemoglobin 15.6, platelet 202,000      Exam  Blood pressure 144/73, pulse 64, temperature 98.1  Blood pressure range 112//111  Pulse range 51-78  Tmax 98.1      Review of system  Pertinent positive negatives  No headache no chest pain no shortness of breath no nausea vomiting no diarrhea no fever chills    No diplopia no dysarthria no dysphagia  No visual changes  Weakness in the legs better maybe a little bit of symptoms on the left leg    Otherwise review systems negative      General exam per primary MD  Awake and alert  HEENT no signs of trauma  Lungs clear  Heart rate regular  Abdomen soft  Symmetrical pulses    Neurologic exam  Alert orient x3  Prosody speech normal  Naming normal  Comprehension normal  Repetition normal  No aphasia  No neglect  Memory recall okay    Cranial nerves II through XII  No ophthalmoplegia  No nystagmus  No visual field cut  Face symmetrical  Tongue twisters good    Upper extremities  No drift  Rapid alternating movements actually seem a pinch slower on the right but  subtle    Lower extremities  Lift off the bed okay wiggles the toes seem relatively symmetrical  When I push down on the legs when he lifts them up he feels that the right leg is slightly stronger than the left    Gait  Deferred        Assessment/plan    Acute bilateral leg weakness and numbness (exam left face arm and leg weakness)        CT scan had right thalamic chronic stroke        Patient was supposed to be on aspirin 81 mg once per day but admits that he probably does miss that a lot      Treated with TNK         TNK administered 00: 37 (10/12/2023)    3.   Vascular risk factors        Hypertension/hyperlipidemia/previous CVA/CKD    4.   MRI with susceptibility changes left thalamus question subacute hemorrhage pt s/p IV TNK      Diagnosis  CVA status post TNK treatment  Hypertensive emergency  Left  thalamic ICH, s/p TNK      MRI of the head does not show an acute ischemic stroke  Left thalamus with subacute hemorrhage    CT HEAD 10/13/2023  1.  There is a small focus of hemorrhage in the left thalamus that is new from comparison CT imaging dated 10/11/2023.  2.  Multiple small chronic infarcts involving the right thalamus and bilateral corona radiat      Hold off on starting antiplatelet agent due to ICH  Hypertension control per primary MD    Follow-up CT head t 10/14/2023  No antiplatelet medication at this time    52 minutes total care time today   discussed with nursing staff/patient//neuroradiology/primary MD

## 2023-10-13 NOTE — PROGRESS NOTES
"[facility]    PROGRESS NOTE - Hospitalist Service    Assessment and Plan    Principal Problem:    Left-sided weakness  Active Problems:    Hyperlipemia    CKD (chronic kidney disease) stage 3, GFR 30-59 ml/min (H)    Acute ischemic stroke (H)    Hypertensive emergency    Fredy Garza is a 58 year old male with h/o  left-sided weakness and numbness     #Acute cerebral ischemia  -Status post TNK, symptoms resolved, needs to stay in ICU for 24 hrs post TNK  -Telemetry monitoring for arrhythmias, if unable to detect arrhythmia will need 30-day event monitor at discharge  -Echocardiogram no WMA and normal EF   -PT/OT/SLP evaluation and treatment  -Neurology consulted and discussed plan with him, hold ASA for now and repeat head CT in am, control BP   - MRI does not show acute ischemic stroke  - CT today shows left thalamus with subacute hemorrhage repeat in morning   - down graded to neuro tele  - activity as tolerated      #Hypertensive emergency resolved   -Noncompliance with blood pressure medications  -Improved on nicardipine drip which is off  - IV hydralazine prn  - continue lisinopril  - aim for lower Bps since small bleed from high BPs     #Hyperglycemia   - likely acute phase reactant     HLP  - on statin  - FLP pending      #Chronic kidney disease stage IIIa  -Avoid nephrotoxins  -Monitor renal function    Clinically Significant Risk Factors      # Overweight: Estimated body mass index is 28.31 kg/m  as calculated from the following:  Height as of this encounter: 1.626 m (5' 4\").  Weight as of this encounter: 74.8 kg (164 lb 14.4 oz)., PRESENT ON ADMISSION    COVID-19 PCR Results          10/12/2023    03:41   COVID-19 PCR Results   SARS CoV2 PCR Negative      COVID-19 Antibody Results, Testing for Immunity           No data to display               Code Status: Full Code  VTE prophylaxis:  Pneumatic Compression Devices  DIET: Orders Placed This Encounter      Combination Diet Regular " Diet; Low Saturated Fat Diet    Drains/Lines: none  Weight bearing status: WBAT     Expected Discharge Date: 10/14/2023    Discharge Delays: Specialist Consult (enter specialist & decision needed in comments)  Imaging Result Pending (enter specific test & time in comments)  Destination: home with family  Discharge Comments: needs repeat Head CT per neurolgoy and if no change in small bleed then can discharge      Subjective:  No complaints eager to discharge.     PHYSICAL EXAM  Temp:  [97.5  F (36.4  C)-98.2  F (36.8  C)] 97.5  F (36.4  C)  Pulse:  [51-99] 84  Resp:  [15-31] 18  BP: (122-178)/() 125/80  SpO2:  [90 %-98 %] 98 %  Wt Readings from Last 1 Encounters:   10/13/23 62.7 kg (138 lb 3.7 oz)       Intake/Output Summary (Last 24 hours) at 10/13/2023 1152  Last data filed at 10/13/2023 0800  Gross per 24 hour   Intake 860 ml   Output 1225 ml   Net -365 ml      Body mass index is 26.12 kg/m .    Physical Exam  Constitutional:       Appearance: Normal appearance.   HENT:      Head: Normocephalic and atraumatic.   Cardiovascular:      Rate and Rhythm: Normal rate and regular rhythm.      Pulses: Normal pulses.   Pulmonary:      Effort: Pulmonary effort is normal.      Breath sounds: Normal breath sounds.   Abdominal:      General: Bowel sounds are normal.      Palpations: Abdomen is soft.   Musculoskeletal:         General: Normal range of motion.      Right lower leg: No edema.      Left lower leg: No edema.   Skin:     General: Skin is warm and dry.      Capillary Refill: Capillary refill takes less than 2 seconds.   Neurological:      General: No focal deficit present.      Mental Status: He is alert. Mental status is at baseline.      Cranial Nerves: No cranial nerve deficit.      Sensory: No sensory deficit.      Motor: No weakness.      Coordination: Coordination normal.      Gait: Gait normal.      Deep Tendon Reflexes: Reflexes normal.       PERTINENT LABS/IMAGING:  Results for orders placed or  performed during the hospital encounter of 10/11/23   CTA Head Neck with Contrast    Impression    IMPRESSION:   HEAD CT:  1.  Multifocal chronic lacunar infarcts throughout the bilateral basal ganglia, deep white matter, and right thalamus. Age-indeterminate infarct in the right corona radiata, new from the prior MRI.  2.  No acute intracranial hemorrhage.  3.  Generalized volume loss and sequelae of chronic microvascular ischemic disease, as described.    HEAD CTA:   1.  Intracranial atherosclerosis, without evidence of a proximal arterial occlusion.    NECK CTA:  1.  Nonflow limiting atherosclerotic plaque at the carotid bifurcations.  2.  Dilatation of the ascending aorta, which may reflect an aortic aneurysm. Findings can be further evaluated with CTA of the chest.    Findings were discussed with Dr. Queen at 2330 hours on 10/11/2023.   MR Brain w/o Contrast    Impression    IMPRESSION:  1.  Focus of susceptibility signal loss within the left thalamus has increased in size compared to the 2018 MRI and may reflect a focus of progressive subacute hemorrhage, potentially associated with an underlying cavernoma.  2.  New punctate focus of susceptibility signal loss within the medial right thalamus without associated edema or other findings to suggest acuity. This may reflect a new focus of chronic microhemorrhage.  3.  Background of generalized brain atrophy and multifocal chronic ischemic changes similar to the previous MRI.  4.  Mild inflammatory changes of the paranasal sinuses.    CT Head w/o Contrast    Impression    IMPRESSION:  1.  There is a small focus of hemorrhage in the left thalamus that is new from comparison CT imaging dated 10/11/2023.  2.  Multiple small chronic infarcts involving the right thalamus and bilateral corona radiata.    Results discussed with Dr. Salter on 10/13/2023 11:28 AM CDT.   Echocardiogram Complete w Bubble study - For age 60 yrs or less   Result Value Ref Range    LVEF   60-65%        Imaging results reviewed over the past 24 hrs:   Recent Results (from the past 24 hour(s))   MR Brain w/o Contrast    Narrative    EXAM: MR BRAIN W/O CONTRAST  LOCATION: [facility]  DATE: 10/12/2023    INDICATION: Acute ischemic stroke  COMPARISON: CTA 10/11/2023. MRI 01/25/2018.  TECHNIQUE: Routine multiplanar multisequence head MRI without intravenous contrast.    FINDINGS:  INTRACRANIAL CONTENTS: Diffusion hyperintensity within the left thalamus surrounding a focus of prominent susceptibility signal loss, but without significant corresponding change in ADC values suggesting T2 shine through. This focus of susceptibility   change within the left thalamus has significantly increased in size compared to the 2018 MRI and may reflect a focus of progressive subacute hemorrhage, potentially associated with an underlying cavernoma. Minor adjacent T2/FLAIR hyperintensity. This   area was isodense to adjacent brain parenchyma on CT further confirming an evolving subacute process. No evidence for acute infarct. There is a punctate focus of new susceptibility signal loss within the medial right thalamus that was not seen on the   previous MRI suggesting interval microhemorrhage in this location without associated edema or other findings to suggest acuity. Additional foci of presumed chronic hemosiderin staining within the left putamen and campbell. Moderate presumed microvascular   ischemic change within the cerebral white matter. Chronic lacunar infarcts of the right thalamus and left greater than right corona radiata/centrum semiovale largely similar to the previous MRI. Mild generalized cerebral atrophy. No hydrocephalus. Normal   position of the cerebellar tonsils.     SELLA: No abnormality accounting for technique.    OSSEOUS STRUCTURES/SOFT TISSUES: Normal marrow signal. The major intracranial vascular flow voids are maintained.     ORBITS: No abnormality accounting for technique.      SINUSES/MASTOIDS: Mild mucosal thickening scattered about the paranasal sinuses. No middle ear or mastoid effusion.       Impression    IMPRESSION:  1.  Focus of susceptibility signal loss within the left thalamus has increased in size compared to the 2018 MRI and may reflect a focus of progressive subacute hemorrhage, potentially associated with an underlying cavernoma.  2.  New punctate focus of susceptibility signal loss within the medial right thalamus without associated edema or other findings to suggest acuity. This may reflect a new focus of chronic microhemorrhage.  3.  Background of generalized brain atrophy and multifocal chronic ischemic changes similar to the previous MRI.  4.  Mild inflammatory changes of the paranasal sinuses.    CT Head w/o Contrast    Narrative    EXAM: CT HEAD W/O CONTRAST  LOCATION: [facility]  DATE: 10/13/2023    INDICATION: ? acute bleed cavernoma, f u TNK Rx for cva  COMPARISON: MRI brain 10/12/2023. CT angiogram head and neck 10/11/2023.  TECHNIQUE: Routine CT Head without IV contrast. Multiplanar reformats. Dose reduction techniques were used.    FINDINGS:  INTRACRANIAL CONTENTS: There is a 6 x 4 mm focus of increased density in the left thalamus.. Chronic lacunar infarcts involving the right thalamus and bilateral corona radiata. Mild to moderate presumed chronic small vessel ischemic changes. Mild   generalized volume loss. No hydrocephalus.     VISUALIZED ORBITS/SINUSES/MASTOIDS: No intraorbital abnormality. No paranasal sinus mucosal disease. No middle ear or mastoid effusion.    BONES/SOFT TISSUES: No acute abnormality.      Impression    IMPRESSION:  1.  There is a small focus of hemorrhage in the left thalamus that is new from comparison CT imaging dated 10/11/2023.  2.  Multiple small chronic infarcts involving the right thalamus and bilateral corona radiata.    Results discussed with Dr. Salter on 10/13/2023 11:28 AM CDT.     Recent Labs    Lab 10/12/23  0402 10/11/23  2325 10/11/23  2316   WBC 6.9 8.4  --    HGB 15.6 15.9  --    MCV 89 87  --     214  --    INR 0.92 0.91  --     140  --    POTASSIUM 3.9 3.7  --    CHLORIDE 104 102  --    CO2 23 27  --    BUN 20.9* 23.7*  --    CR 1.20* 1.45*  --    ANIONGAP 12 11  --    SHERRON 9.6 9.8  --    * 118* 121*         25 MINUTES SPENT BY ME on the date of service doing chart review, history, exam, documentation, discussion with nursing staff and specialist, & further activities per the note.  Trudy Hallman MD  [facility]  [phone]

## 2023-10-14 ENCOUNTER — APPOINTMENT (OUTPATIENT)
Dept: CT IMAGING | Facility: HOSPITAL | Age: 58
End: 2023-10-14
Attending: INTERNAL MEDICINE
Payer: COMMERCIAL

## 2023-10-14 VITALS
RESPIRATION RATE: 18 BRPM | OXYGEN SATURATION: 94 % | DIASTOLIC BLOOD PRESSURE: 83 MMHG | WEIGHT: 138.23 LBS | SYSTOLIC BLOOD PRESSURE: 127 MMHG | BODY MASS INDEX: 26.1 KG/M2 | HEIGHT: 61 IN | TEMPERATURE: 97.9 F | HEART RATE: 62 BPM

## 2023-10-14 PROCEDURE — 99233 SBSQ HOSP IP/OBS HIGH 50: CPT | Performed by: PSYCHIATRY & NEUROLOGY

## 2023-10-14 PROCEDURE — 250N000013 HC RX MED GY IP 250 OP 250 PS 637: Performed by: INTERNAL MEDICINE

## 2023-10-14 PROCEDURE — 99239 HOSP IP/OBS DSCHRG MGMT >30: CPT | Performed by: INTERNAL MEDICINE

## 2023-10-14 PROCEDURE — 70450 CT HEAD/BRAIN W/O DYE: CPT

## 2023-10-14 RX ORDER — ATORVASTATIN CALCIUM 40 MG/1
40 TABLET, FILM COATED ORAL AT BEDTIME
Qty: 30 TABLET | Refills: 0 | Status: SHIPPED | OUTPATIENT
Start: 2023-10-14 | End: 2023-10-14

## 2023-10-14 RX ADMIN — FAMOTIDINE 20 MG: 20 TABLET ORAL at 08:55

## 2023-10-14 RX ADMIN — LISINOPRIL 30 MG: 20 TABLET ORAL at 08:55

## 2023-10-14 ASSESSMENT — ACTIVITIES OF DAILY LIVING (ADL)
ADLS_ACUITY_SCORE: 20

## 2023-10-14 NOTE — PLAN OF CARE
Problem: Adult Inpatient Plan of Care  Goal: Readiness for Transition of Care  Outcome: Progressing     Problem: Risk for Delirium  Goal: Optimal Coping  Outcome: Progressing   Goal Outcome Evaluation:       A&Ox4. Not scoring on NIH. Independent, steady gait. Pt is hopeful to discharge tomorrow.

## 2023-10-14 NOTE — PHARMACY-CONSULT NOTE
Pharmacy Consult to evaluate for medication related stroke core measures    Fredy Garza, 58 year old male admitted for stroke symptoms on 10/11/2023.    Thrombolytic was given on 10/12/23 at 0036.    VTE Prophylaxis SCDs /PCDs placed on 10/12, as appropriate prior to end of hospital day 2.    Antithrombotic:  hold off on starting antiplatelet agent due to ICH      Anticoagulation if history of A-fib/flutter: Patient does not have history of A-fib/flutter - anticoagulation not required for medication related stroke core measures.     LDL Cholesterol Calculated   Date Value Ref Range Status   10/11/2023 179 (H) <=100 mg/dL Final       Patient's home statin, Lipitor (atorvastatin) restarted; continue statin on discharge to meet quality measures, unless contraindicated.     Recommendations: None at this time    Thank you for the consult.    Gordo Mckeon RPH 10/14/2023 8:32 AM

## 2023-10-14 NOTE — DISCHARGE SUMMARY
"[facility]  Hospitalist Discharge Summary      Date of Admission:  10/11/2023  Date of Discharge:  10/14/2023  Discharging Provider: Trudy Hallman MD  Discharge Service: Hospitalist Service    Discharge Diagnoses   Principal Problem:    Left-sided weakness  Active Problems:    Hyperlipemia    CKD (chronic kidney disease) stage 3, GFR 30-59 ml/min (H)    Acute ischemic stroke (H)    Hypertensive emergency        Clinically Significant Risk Factors     # Overweight: Estimated body mass index is 26.12 kg/m  as calculated from the following:    Height as of this encounter: 1.549 m (5' 1\").    Weight as of this encounter: 62.7 kg (138 lb 3.7 oz).       Follow-ups Needed After Discharge   Follow-up Appointments     Follow-up and recommended labs and tests       Follow up with primary care provider, Zain Roca, within 3-5days, to   evaluate medication change and for hospital follow- up. The following   labs/tests are recommended: BMP and CBC, Blood pressure check.            Unresulted Labs Ordered in the Past 30 Days of this Admission       No orders found from 9/11/2023 to 10/12/2023.            Discharge Disposition   Discharged to home  Condition at discharge: Stable    Hospital Course   Fredy Garza is a 58 year old male with h/o  left-sided weakness and numbness     #Acute cerebral ischemia  -Status post TNK, symptoms resolved, needs to stay in ICU for 24 hrs post TNK  -Echocardiogram no WMA and normal EF   -PT/OT/SLP evaluation and treatment no further need  -Neurology consulted and discussed plan with him, CT today shows left thalamus with subacute hemorrhage repeat this morning no change.hold ASA and ensure BP control  - neurology follow up   - activity as tolerated      #Hypertensive emergency resolved   -Noncompliance with blood pressure medications  -Improved on nicardipine drip which is off  - continue lisinopril Bps controlled on current med  - aim for lower Bps since small bleed " from high BPs     #Hyperglycemia   - likely acute phase reactant     HLP  - on statin  - FLP pending      #Chronic kidney disease stage IIIa  -Avoid nephrotoxins  -Monitor renal function    Consultations This Hospital Stay   NEUROLOGY IP STROKE CONSULT  SPEECH LANGUAGE PATH ADULT IP CONSULT  PHARMACY IP CONSULT  PHARMACY IP CONSULT  PHARMACY IP CONSULT  PHYSICAL THERAPY ADULT IP CONSULT  OCCUPATIONAL THERAPY ADULT IP CONSULT  REHAB ADMISSIONS LIAISON IP CONSULT  CARE MANAGEMENT / SOCIAL WORK IP CONSULT    Code Status   Full Code    Time Spent on this Encounter   I, Trudy Hallman MD, personally saw the patient today and spent greater than 30 minutes discharging this patient.       Trudy Hallman MD  [address] [address]  Phone: [phone]  Fax: [phone]  ______________________________________________________________________    Physical Exam   Vital Signs: Temp: 97.9  F (36.6  C) Temp src: Oral BP: 127/83 Pulse: 62   Resp: 18 SpO2: 94 % O2 Device: None (Room air)    Weight: 138 lbs 3.65 oz  Physical Exam  Constitutional:       Appearance: Normal appearance.   HENT:      Head: Normocephalic and atraumatic.   Neck:      Vascular: No carotid bruit.   Cardiovascular:      Rate and Rhythm: Normal rate and regular rhythm.      Pulses: Normal pulses.      Heart sounds: Normal heart sounds.   Pulmonary:      Effort: Pulmonary effort is normal.      Breath sounds: Normal breath sounds.   Abdominal:      General: Bowel sounds are normal.      Palpations: Abdomen is soft.   Musculoskeletal:         General: Normal range of motion.      Cervical back: Normal range of motion and neck supple.   Skin:     General: Skin is warm and dry.      Capillary Refill: Capillary refill takes less than 2 seconds.   Neurological:      General: No focal deficit present.      Mental Status: He is alert and oriented to person, place, and time. Mental status is at  baseline.              Primary Care Physician   Zain Roca    Discharge Orders      Medication Therapy Management Referral      Reason for your hospital stay    Principal Problem:    Left-sided weakness  Active Problems:    Hyperlipemia    CKD (chronic kidney disease) stage 3, GFR 30-59 ml/min (H)    Acute ischemic stroke (H)    Hypertensive emergency     Follow-up and recommended labs and tests     Follow up with primary care provider, Zain Roca, within 3-5days, to evaluate medication change and for hospital follow- up. The following labs/tests are recommended: BMP and CBC, Blood pressure check.     Activity    Your activity upon discharge: activity as tolerated     Discharge Instructions    Hold you Asprin until follow up with Neurology     Diet    Follow this diet upon discharge: Orders Placed This Encounter      Combination Diet Regular Diet; Low Saturated Fat Diet     Stroke Hospital Follow Up    Please be aware that coverage of these services is subject to the terms and limitations of your health insurance plan.  Call member services at your health plan with any benefit or coverage questions.  [facility] will call you to coordinate care as prescribed by your provider. If you don t hear from a representative within 2 business days, please call [phone].         Significant Results and Procedures   Most Recent 3 CBC's:  Recent Labs   Lab Test 10/12/23  0402 10/11/23  2325 12/26/20  0755   WBC 6.9 8.4 10.6   HGB 15.6 15.9 16.4   MCV 89 87 87    214 228     Most Recent 3 BMP's:  Recent Labs   Lab Test 10/13/23  2102 10/12/23  0402 10/11/23  2325 10/11/23  2316 12/27/20  0524   NA  --  139 140  --  140   POTASSIUM  --  3.9 3.7  --  3.9   CHLORIDE  --  104 102  --  107   CO2  --  23 27  --  24   BUN  --  20.9* 23.7*  --  22   CR  --  1.20* 1.45*  --  1.32*   ANIONGAP  --  12 11  --  9   SHERRON  --  9.6 9.8  --  8.8   * 110* 118*   < > 95    < > = values in this interval not displayed.     Most  Recent 2 LFT's:  Recent Labs   Lab Test 12/27/20  0524 12/26/20  0755 01/25/18  1155   AST  --  24 28   ALT  --  30 22   ALKPHOS  --  76 61   BILITOTAL 2.2* 2.0* 1.0     Most Recent 3 INR's:  Recent Labs   Lab Test 10/12/23  0402 10/11/23  2325 12/26/20  0755   INR 0.92 0.91 0.93     7-Day Micro Results       Collected Updated Procedure Result Status      10/12/2023 0341 10/12/2023 0426 Asymptomatic Influenza A/B, RSV, & SARS-CoV2 PCR (COVID-19) Nose [[id number]]    Swab from Nose    Final result Component Value   Influenza A PCR Negative   Influenza B PCR Negative   RSV PCR Negative   SARS CoV2 PCR Negative   NEGATIVE: SARS-CoV-2 (COVID-19) RNA not detected, presumed negative.                  Most Recent TSH and T4:No lab results found.  Most Recent 6 glucoses:  Recent Labs   Lab Test 10/13/23  2102 10/12/23  0402 10/11/23  2325 10/11/23  2316 12/27/20  0524 12/26/20  0755   * 110* 118* 121* 95 104     Most Recent Urinalysis:  Recent Labs   Lab Test 01/25/18  1231   COLOR Colorless   APPEARANCE Clear   URINEGLC Negative   URINEBILI Negative   URINEKETONE Negative   SG 1.004   UBLD Negative   URINEPH 6.5   PROTEIN Negative   UROBILINOGEN <2.0 E.U./dL   NITRITE Negative   LEUKEST Negative   ,   Results for orders placed or performed during the hospital encounter of 10/11/23   CTA Head Neck with Contrast    Narrative    EXAM: CTA HEAD NECK W CONTRAST  LOCATION: [facility]  DATE: 10/11/2023    INDICATION: Neuro deficit. Left arm and bilateral lower extremity weakness.  COMPARISON: Brain MRI dated 01/25/2018  CONTRAST: Isovue 370 75 ml.  TECHNIQUE: Head and neck CT angiogram with IV contrast. Noncontrast head CT followed by axial helical CT images of the head and neck vessels obtained during the arterial phase of intravenous contrast administration. Axial 2D reconstructed images and   multiplanar 3D MIP reconstructed images of the head and neck vessels were performed by the  technologist. Dose reduction techniques were used. All stenosis measurements made according to NASCET criteria unless otherwise specified.    FINDINGS:   NONCONTRAST HEAD CT:   INTRACRANIAL CONTENTS: No intracranial hemorrhage, extraaxial collection, or mass effect.  Multifocal chronic infarcts in the bilateral basal ganglia, right thalamus, and deep white matter. The right periventricular white matter is new, but is   well-defined and favored to be chronic. There is an age-indeterminate lacunar infarct in the right corona radiata that is new from the prior MRI. Moderate presumed chronic small vessel ischemic changes. Mild generalized volume loss. No hydrocephalus.     VISUALIZED ORBITS/SINUSES/MASTOIDS: No intraorbital abnormality. No paranasal sinus mucosal disease. No middle ear or mastoid effusion.    BONES/SOFT TISSUES: No acute abnormality.    HEAD CTA:  ANTERIOR CIRCULATION: Diffuse atherosclerotic calcification in the carotid siphons without flow-limiting stenosis. Mild stenoses throughout the branches of the anterior middle cerebral arteries, consistent with atherosclerosis. No evidence of a proximal   arterial occlusion or saccular aneurysm. Fetal origin of the right posterior cerebral artery from the anterior circulation.    POSTERIOR CIRCULATION: Multifocal atherosclerotic plaque in the vertebral arteries resulting in mild stenosis. The basilar artery is patent. No proximal arterial occlusion. Mild stenoses of the bilateral PCA branches, consistent with atherosclerosis.   Balanced vertebral arteries supply a normal basilar artery.     DURAL VENOUS SINUSES: Expected enhancement of the major dural venous sinuses.    NECK CTA:  RIGHT CAROTID: Atherosclerotic plaque results in less than 50% stenosis in the right ICA. No dissection.    LEFT CAROTID: Atherosclerotic plaque results in less than 50% stenosis in the left ICA. No dissection.    VERTEBRAL ARTERIES: No focal stenosis or dissection. Balanced  vertebral arteries.    AORTIC ARCH: Dilatation of the ascending aorta measuring 3.8 cm in diameter, suggestive of an aortic aneurysm. No flow-limiting stenosis of the proximal great vessels. Mild degenerative changes of the cervical spine without high-grade spinal canal   stenosis. Visualized lung apices are clear.    NONVASCULAR STRUCTURES: Unremarkable.      Impression    IMPRESSION:   HEAD CT:  1.  Multifocal chronic lacunar infarcts throughout the bilateral basal ganglia, deep white matter, and right thalamus. Age-indeterminate infarct in the right corona radiata, new from the prior MRI.  2.  No acute intracranial hemorrhage.  3.  Generalized volume loss and sequelae of chronic microvascular ischemic disease, as described.    HEAD CTA:   1.  Intracranial atherosclerosis, without evidence of a proximal arterial occlusion.    NECK CTA:  1.  Nonflow limiting atherosclerotic plaque at the carotid bifurcations.  2.  Dilatation of the ascending aorta, which may reflect an aortic aneurysm. Findings can be further evaluated with CTA of the chest.    Findings were discussed with Dr. Queen at 2330 hours on 10/11/2023.   MR Brain w/o Contrast    Narrative    EXAM: MR BRAIN W/O CONTRAST  LOCATION: [facility]  DATE: 10/12/2023    INDICATION: Acute ischemic stroke  COMPARISON: CTA 10/11/2023. MRI 01/25/2018.  TECHNIQUE: Routine multiplanar multisequence head MRI without intravenous contrast.    FINDINGS:  INTRACRANIAL CONTENTS: Diffusion hyperintensity within the left thalamus surrounding a focus of prominent susceptibility signal loss, but without significant corresponding change in ADC values suggesting T2 shine through. This focus of susceptibility   change within the left thalamus has significantly increased in size compared to the 2018 MRI and may reflect a focus of progressive subacute hemorrhage, potentially associated with an underlying cavernoma. Minor adjacent T2/FLAIR hyperintensity.  This   area was isodense to adjacent brain parenchyma on CT further confirming an evolving subacute process. No evidence for acute infarct. There is a punctate focus of new susceptibility signal loss within the medial right thalamus that was not seen on the   previous MRI suggesting interval microhemorrhage in this location without associated edema or other findings to suggest acuity. Additional foci of presumed chronic hemosiderin staining within the left putamen and campbell. Moderate presumed microvascular   ischemic change within the cerebral white matter. Chronic lacunar infarcts of the right thalamus and left greater than right corona radiata/centrum semiovale largely similar to the previous MRI. Mild generalized cerebral atrophy. No hydrocephalus. Normal   position of the cerebellar tonsils.     SELLA: No abnormality accounting for technique.    OSSEOUS STRUCTURES/SOFT TISSUES: Normal marrow signal. The major intracranial vascular flow voids are maintained.     ORBITS: No abnormality accounting for technique.     SINUSES/MASTOIDS: Mild mucosal thickening scattered about the paranasal sinuses. No middle ear or mastoid effusion.       Impression    IMPRESSION:  1.  Focus of susceptibility signal loss within the left thalamus has increased in size compared to the 2018 MRI and may reflect a focus of progressive subacute hemorrhage, potentially associated with an underlying cavernoma.  2.  New punctate focus of susceptibility signal loss within the medial right thalamus without associated edema or other findings to suggest acuity. This may reflect a new focus of chronic microhemorrhage.  3.  Background of generalized brain atrophy and multifocal chronic ischemic changes similar to the previous MRI.  4.  Mild inflammatory changes of the paranasal sinuses.    CT Head w/o Contrast    Narrative    EXAM: CT HEAD W/O CONTRAST  LOCATION: [facility]  DATE: 10/13/2023    INDICATION: ? acute bleed  cavernoma, f u TNK Rx for cva  COMPARISON: MRI brain 10/12/2023. CT angiogram head and neck 10/11/2023.  TECHNIQUE: Routine CT Head without IV contrast. Multiplanar reformats. Dose reduction techniques were used.    FINDINGS:  INTRACRANIAL CONTENTS: There is a 6 x 4 mm focus of increased density in the left thalamus.. Chronic lacunar infarcts involving the right thalamus and bilateral corona radiata. Mild to moderate presumed chronic small vessel ischemic changes. Mild   generalized volume loss. No hydrocephalus.     VISUALIZED ORBITS/SINUSES/MASTOIDS: No intraorbital abnormality. No paranasal sinus mucosal disease. No middle ear or mastoid effusion.    BONES/SOFT TISSUES: No acute abnormality.      Impression    IMPRESSION:  1.  There is a small focus of hemorrhage in the left thalamus that is new from comparison CT imaging dated 10/11/2023.  2.  Multiple small chronic infarcts involving the right thalamus and bilateral corona radiata.    Results discussed with Dr. Salter on 10/13/2023 11:28 AM CDT.   CT Head w/o Contrast    Narrative    EXAM: CT HEAD W/O CONTRAST  LOCATION: [facility]  DATE: 10/14/2023    INDICATION: follow up ICH thalamus  COMPARISON: 10/13/2023.  TECHNIQUE: Routine CT Head without IV contrast. Multiplanar reformats. Dose reduction techniques were used.    FINDINGS:  INTRACRANIAL CONTENTS: Again visualized is the fairly stable left thalamic hemorrhage. There is no new extra-axial fluid collection or new intraparenchymal hemorrhage. No CT evidence of acute infarct. There are diffuse scattered areas of low-attenuation   within the periventricular and subcortical white matter consistent with diffuse small vessel ischemic disease. There are old lacunar infarcts involving the basal ganglia bilaterally and the thalami bilaterally. The ventricular system, basal cisterns and   the cortical sulci are consistent with mild diffuse volume loss.     VISUALIZED  ORBITS/SINUSES/MASTOIDS: No intraorbital abnormality. No paranasal sinus mucosal disease. No middle ear or mastoid effusion.    BONES/SOFT TISSUES: No acute abnormality.      Impression    IMPRESSION:  1.  Fairly stable left thalamic hemorrhage.  2.  No new hemorrhage or midline shift.  3.  Advanced age related changes along with old lacunar infarcts basal ganglia and thalami bilaterally.   Echocardiogram Complete w Bubble study - For age 60 yrs or less     Value    LVEF  60-65%    [facility]    [id number]  [id number]  [id number]  [id number]^TEO^OCTAVIANO^[place] [address]     Name: TAN SWARTZ  MRN: [id number]  : 1965  Study Date: 10/12/2023 07:29 AM  Age: 58 yrs  Gender: Male  Patient Location: [facility]  Reason For Study: Cerebrovascular Incident  Ordering Physician: OCTAVIANO BRUCE  Performed By:      BSA: 1.6 m2  Height: 62 in  Weight: 138 lb  HR: 61  ______________________________________________________________________________  Procedure  Complete Portable Echo Adult. Complete Portable Bubble Echo Adult.  ______________________________________________________________________________  Interpretation Summary     Normal left ventricular size. Moderate concentric left ventricular  hypertrophy. Left ventricular systolic function is normal. Left ventricular  ejection fraction is estimated at 60 to 65%. No regional wall motion  abnormality noted.  Normal right ventricular size. Possible mild decrease in right ventricular  systolic function with mildly abnormal TAPSE.  Negative echo contrast bubble study at rest and with Valsalva.  No hemodynamically significant valve abnormality.  Mild mitral regurgitation.  The proximal ascending aorta measures borderline enlarged at 3.7 cm.  ______________________________________________________________________________  I      WMSI = 1.00     % Normal = 100     X - Cannot   0 -                      (2) - Mildly 2 -           Segments  Size  Interpret    Hyperkinetic 1 - Normal  Hypokinetic  Hypokinetic  1-2     small                                                     7 -          3-5      moderate  3 - Akinetic 4 -          5 -         6 - Akinetic Dyskinetic   6-14    large               Dyskinetic   Aneurysmal  w/scar       w/scar       15-16   diffuse     Left Ventricle  The left ventricle is normal in size. There is moderate concentric left  ventricular hypertrophy. Diastolic Doppler findings (E/E' ratio and/or other  parameters) suggest left ventricular filling pressures are indeterminate. Left  ventricular systolic function is normal. The visual ejection fraction is 60-  65%. Normal left ventricular wall motion.     Right Ventricle  The right ventricle is normal size. TAPSE is abnormal, which is consistent  with abnormal right ventricular systolic function. Mildly decreased right  ventricular systolic function.     Atria  Normal left atrial size. Right atrial size is normal. A contrast injection  (Bubble Study) was performed that was negative for flow across the interatrial  septum.     Mitral Valve  Mitral valve leaflets appear normal. There is mild (1+) mitral regurgitation.     Tricuspid Valve  The tricuspid valve is not well visualized, but is grossly normal. There is  trace tricuspid regurgitation. Right ventricular systolic pressure could not  be approximated due to inadequate tricuspid regurgitation.     Aortic Valve  There is mild trileaflet aortic sclerosis. No hemodynamically significant  valvular aortic stenosis.     Pulmonic Valve  The pulmonic valve is not well seen, but is grossly normal. There is trace  pulmonic valvular regurgitation.     Vessels  The aorta root is normal. Proximal ascending aorta measures borderline  enlarged at 3.7 cm. IVC diameter <2.1 cm collapsing >50% with sniff suggests a  normal RA pressure of 3 mmHg.     Pericardium  There is no pericardial effusion.      ______________________________________________________________________________  MMode/2D Measurements & Calculations  IVSd: 1.5 cm  LVIDd: 3.6 cm  LVIDs: 2.6 cm  LVPWd: 1.4 cm  FS: 29.0 %  LV mass(C)d: 192.7 grams  LV mass(C)dI: 118.0 grams/m2  Ao root diam: 3.1 cm  LA dimension: 3.8 cm     asc Aorta Diam: 3.7 cm  LA/Ao: 1.2  LVOT diam: 2.1 cm  LVOT area: 3.5 cm2  Ao root diam index Ht(cm/m): 2.0  Ao root diam index BSA (cm/m2): 1.9  Asc Ao diam index BSA (cm/m2): 2.3  Asc Ao diam index Ht(cm/m): 2.3  LA Volume (BP): 42.7 ml  LA Volume Index (BP): 26.2 ml/m2     LA Volume Indexed (AL/bp): 28.1 ml/m2  RV Base: 4.0 cm  RWT: 0.79  TAPSE: 1.6 cm     Doppler Measurements & Calculations  MV E max omer: 62.9 cm/sec  MV A max omer: 79.7 cm/sec  MV E/A: 0.79  MV max PG: 3.6 mmHg  MV mean P.0 mmHg  MV V2 VTI: 26.5 cm  MVA(VTI): 2.6 cm2  MV dec slope: 233.0 cm/sec2  MV dec time: 0.27 sec  Ao V2 max: 139.0 cm/sec  Ao max P.0 mmHg  Ao V2 mean: 103.0 cm/sec  Ao mean P.0 mmHg  Ao V2 VTI: 28.8 cm  MURALI(I,D): 2.4 cm2  MURALI(V,D): 2.3 cm2  LV V1 max PG: 3.4 mmHg  LV V1 max: 91.8 cm/sec  LV V1 VTI: 19.6 cm     SV(LVOT): 67.9 ml  SI(LVOT): 41.6 ml/m2  PA V2 max: 88.2 cm/sec  PA max PG: 3.1 mmHg  PA acc time: 0.11 sec  PI end-d omer: 117.0 cm/sec  AV Omer Ratio (DI): 0.66  MURALI Index (cm2/m2): 1.4  E/E' av.4  Lateral E/e': 11.3  Medial E/e': 13.4  RV S Omer: 10.1 cm/sec     ______________________________________________________________________________  Report approved by: Shira Cuellar 10/12/2023 08:33 AM             Discharge Medications   Current Discharge Medication List        CONTINUE these medications which have NOT CHANGED    Details   atorvastatin (LIPITOR) 80 MG tablet [ATORVASTATIN (LIPITOR) 80 MG TABLET] Take 80 mg by mouth at bedtime.      lisinopriL (PRINIVIL,ZESTRIL) 30 MG tablet [LISINOPRIL (PRINIVIL,ZESTRIL) 30 MG TABLET] Take 30 mg by mouth daily.      nitroglycerin (NITROSTAT) 0.4 MG SL tablet  [NITROGLYCERIN (NITROSTAT) 0.4 MG SL TABLET] Place 0.4 mg under the tongue every 5 (five) minutes as needed for chest pain.           STOP taking these medications       aspirin 81 MG EC tablet Comments:   Reason for Stopping:             Allergies   No Known Allergies

## 2023-10-14 NOTE — PLAN OF CARE
Problem: Adult Inpatient Plan of Care  Goal: Optimal Comfort and Wellbeing  Outcome: Progressing   Goal Outcome Evaluation:       Pt alert/oriented, denies pain. NIH 0. Independent in room, spouse at bedside. CT this morning, anticipated discharge today.

## 2023-10-14 NOTE — PROGRESS NOTES
Care Management Discharge Note    Discharge Date: 10/14/2023       Discharge Disposition:  Home      Additional Information:  Discharge anticipated. No CM needs identified.    DEANGELO Morataya

## 2023-10-14 NOTE — PROGRESS NOTES
NEUROLOGY PROGRESS NOTE     ASSESSMENT & PLAN   Visit diagnosis: TIA    Bilateral leg weakness/numbness-rule out TIA  Status post tenecteplase  Left thalamus hemorrhage-suspect hemorrhagic transformation  History of chronic lacunar infarcts in the basal ganglia and thalami bilaterally  History of hypertension, diabetes, hyperlipidemia    MRI brain shows acute hemorrhage in the left thalamus with chronic lacunar infarcts  Status post tenecteplase.  MRI did not show acute infarct though could be hidden due to superimposed hemorrhage  Repeat Head CT stable x24 hours  Ischemic stroke/hemorrhage could be related to use of tenecteplase versus vascular risk factors  Blood pressure can be normalized with aggressive outpatient control of blood pressure  Echocardiogram with 60 to 65% ejection fraction  Cardiac monitoring  Lipid panel and statin  Antiplatelet medication being held per previous neurology notes  PT/OT  Aggressive control of vascular risk factors    Neurology Discharge Planning : Per primary team.    Patient Active Problem List    Diagnosis Date Noted    Hypertensive emergency 10/13/2023     Priority: Medium    Acute ischemic stroke (H) 10/12/2023     Priority: Medium    Left-sided weakness 10/12/2023     Priority: Medium    Chest pain 12/26/2020     Priority: Medium    History of TIA (transient ischemic attack) and stroke 12/26/2020     Priority: Medium    Hyperlipemia      Priority: Medium    TIA (transient ischemic attack) 01/25/2018     Priority: Medium    Hypertensive crisis 01/25/2018     Priority: Medium    Renal insufficiency 01/25/2018     Priority: Medium    CKD (chronic kidney disease) stage 3, GFR 30-59 ml/min (H) 01/25/2018     Priority: Medium     Medical History  Past Medical History:   Diagnosis Date    HTN (hypertension)     Hyperlipemia         SUBJECTIVE     Patient seen in follow for Dr. Doe.      Patient to be discharged later today.  Reports no ongoing symptoms.  No headaches.  Tolerating  "his medications.  Head CT done today.  Has a question about resuming activities and going hunting.  Discussed restrictions.     OBJECTIVE     Vital signs in last 24 hours  Temp:  [97.5  F (36.4  C)-98.2  F (36.8  C)] 97.9  F (36.6  C)  Pulse:  [58-84] 62  Resp:  [18] 18  BP: (117-152)/(74-93) 127/83  SpO2:  [92 %-98 %] 94 %      Review of Systems   No new symptoms    PHYSICAL EXAMINATION  VITALS: /83 (BP Location: Left arm)   Pulse 62   Temp 97.9  F (36.6  C) (Oral)   Resp 18   Ht 1.549 m (5' 1\")   Wt 62.7 kg (138 lb 3.7 oz)   SpO2 94%   BMI 26.12 kg/m    GENERAL -Health appearing, No apparent distress  EYES- No scleral icterus, no eyelid droop, Pupils - see Neuro section  HEENT - Normocephalic, atraumatic, Hearing grossly intact; Oral mucosa moist and pink in color. External Ears and nose intact.   Neck - supple   PULM - Good spontaneous respiratory effort;   CV-extremities warm and well-perfused  MSK- Gait - see Neuro section; Strength and tone- see Neuro section; Range of motion grossly intact.  PSYCH -cooperative    Neurological  Mental status - Patient is awake and oriented to self, place and time. Attention span is normal. Language is fluent and follows commands appropriately.   Cranial nerves - CN II-XII intact. Pupils are reactive and symmetric; EOMI, VFIT, NLF symmetric  Motor - There is no pronator drift. Motor exam shows 5/5 strength in all extremities.  Tone - Tone is symmetric bilaterally in upper and lower extremities.  Reflexes - Reflexes are symmetric   Sensation - Sensory exam is grossly intact to light touch, pain.   Coordination - Finger to nose and heel to shin is without dysmetria.  Gait and station --able to ambulate.     DIAGNOSTIC STUDIES     Pertinent Radiology   CT head  IMPRESSION:  1.  Fairly stable left thalamic hemorrhage.  2.  No new hemorrhage or midline shift.  3.  Advanced age related changes along with old lacunar infarcts basal ganglia and thalami bilaterally.    MRI " brain  IMPRESSION:  1.  Focus of susceptibility signal loss within the left thalamus has increased in size compared to the 2018 MRI and may reflect a focus of progressive subacute hemorrhage, potentially associated with an underlying cavernoma.  2.  New punctate focus of susceptibility signal loss within the medial right thalamus without associated edema or other findings to suggest acuity. This may reflect a new focus of chronic microhemorrhage.  3.  Background of generalized brain atrophy and multifocal chronic ischemic changes similar to the previous MRI.  4.  Mild inflammatory changes of the paranasal sinuses.     CTA  HEAD CT:  1.  Multifocal chronic lacunar infarcts throughout the bilateral basal ganglia, deep white matter, and right thalamus. Age-indeterminate infarct in the right corona radiata, new from the prior MRI.  2.  No acute intracranial hemorrhage.  3.  Generalized volume loss and sequelae of chronic microvascular ischemic disease, as described.     HEAD CTA:   1.  Intracranial atherosclerosis, without evidence of a proximal arterial occlusion.     NECK CTA:  1.  Nonflow limiting atherosclerotic plaque at the carotid bifurcations.  2.  Dilatation of the ascending aorta, which may reflect an aortic aneurysm. Findings can be further evaluated with CTA of the chest.    ECHO  Normal left ventricular size. Moderate concentric left ventricular  hypertrophy. Left ventricular systolic function is normal. Left ventricular  ejection fraction is estimated at 60 to 65%. No regional wall motion  abnormality noted.  Normal right ventricular size. Possible mild decrease in right ventricular  systolic function with mildly abnormal TAPSE.  Negative echo contrast bubble study at rest and with Valsalva.  No hemodynamically significant valve abnormality.  Mild mitral regurgitation.  The proximal ascending aorta measures borderline enlarged at 3.7 cm.    Component      Latest Ref Rng 10/11/2023  11:25 PM   Cholesterol       <200 mg/dL 270 (H)    Triglycerides      <150 mg/dL 189 (H)    HDL Cholesterol      >=40 mg/dL 53    LDL Cholesterol Calculated      <=100 mg/dL 179 (H)    Non HDL Cholesterol      <130 mg/dL 217 (H)    Hemoglobin A1C      <5.7 % 6.1 (H)       Legend:  (H) High    Recent Results (from the past 24 hour(s))   Glucose by meter    Collection Time: 10/13/23  9:02 PM   Result Value Ref Range    GLUCOSE BY METER POCT 110 (H) 70 - 99 mg/dL         HOSPITAL MEDICATIONS      atorvastatin  80 mg Oral At Bedtime    famotidine  20 mg Oral BID    Or    famotidine  20 mg Per NG tube BID    lisinopril  30 mg Oral Daily    sodium chloride (PF)  3 mL Intracatheter Q8H        Total time spent for face to face visit, reviewing labs/imaging studies, counseling and coordination of care was: Over 50 min More than 50% of this time was spent on counseling and coordination of care.    Patient new to me.  Reviewing chart.  Reviewing imaging studies.  Counseling patient/family.    Tra Reinoso MD  Neurologist  [facility]  Tel:- [phone]

## 2023-10-14 NOTE — PLAN OF CARE
Problem: Adult Inpatient Plan of Care  Goal: Plan of Care Review  Description: The Plan of Care Review/Shift note should be completed every shift.  The Outcome Evaluation is a brief statement about your assessment that the patient is improving, declining, or no change.  This information will be displayed automatically on your shift  note.  Outcome: Adequate for Care Transition   Goal Outcome Evaluation:             Pt discharging to home with wife at this time.  Denied pain.  NIH=0  All discharge medications and instructions reviewed with wife and pt. Stroke handbook given.    Pt stated that he was picking up his medications from his own pharmacy as he still has same doses and medications at home at this time.

## 2023-10-16 ENCOUNTER — PATIENT OUTREACH (OUTPATIENT)
Dept: CARE COORDINATION | Facility: CLINIC | Age: 58
End: 2023-10-16
Payer: COMMERCIAL

## 2023-10-19 ENCOUNTER — TRANSCRIBE ORDERS (OUTPATIENT)
Dept: OTHER | Age: 58
End: 2023-10-19

## 2023-10-19 ENCOUNTER — PATIENT OUTREACH (OUTPATIENT)
Dept: CARE COORDINATION | Facility: CLINIC | Age: 58
End: 2023-10-19
Payer: COMMERCIAL

## 2023-10-19 DIAGNOSIS — R53.1 LEFT-SIDED WEAKNESS: ICD-10-CM

## 2023-10-19 DIAGNOSIS — G45.9 TRANSIENT CEREBRAL ISCHEMIA, UNSPECIFIED TYPE: Primary | ICD-10-CM

## 2023-10-19 ASSESSMENT — ACTIVITIES OF DAILY LIVING (ADL): DEPENDENT_IADLS:: INDEPENDENT

## 2023-10-19 NOTE — PROGRESS NOTES
Stroke RN Care Coordination - Initial Outreach Note     SITUATION     Fredy Garza is a 58 year old male who is receiving support for:  Clinic Care Coordination - Initial (Stroke RNCC Outreach)    BACKGROUND     Fredy Garza is a 58 year old male with h/o  left-sided weakness and numbness     #Acute cerebral ischemia  -Status post TNK, symptoms resolved, needs to stay in ICU for 24 hrs post TNK  -Echocardiogram no WMA and normal EF   -PT/OT/SLP evaluation and treatment no further need  -Neurology consulted and discussed plan with him, CT today shows left thalamus with subacute hemorrhage repeat this morning no change.hold ASA and ensure BP control  - neurology follow up   - activity as tolerated      #Hypertensive emergency resolved   -Noncompliance with blood pressure medications  -Improved on nicardipine drip which is off  - continue lisinopril Bps controlled on current med  - aim for lower Bps since small bleed from high BPs     #Hyperglycemia   - likely acute phase reactant     HLP  - on statin  - FLP pending      #Chronic kidney disease stage IIIa  -Avoid nephrotoxins  -Monitor renal function    ASSESSMENT     Spoke with pt and he reports he's doing well since getting home. He is taking his medications using a pill box now, but asked if he needs new prescriptions as he is unsure if his current medication bottles are too old. I discussed that medications usually take several years to  so it would not be thought that his current meds are . He noted previous non-compliance, so I advised he reach out to his pharmacy to get new refills and then dispose of the old ones. Pt voiced understanding of this and states he will contact his pharmacy. I told him that if he needs a new order, he can ask his PCP. He still feels that his L side is slightly weaker, but it's improving and he does not feel he needs OP PT at this time. I advised that if in a few weeks he is still having weakness, he should request a  referral from his PCP. Confirmed stroke follow up appt on 11/2 and provided address to pt. He did not have any other stroke related follow up needs at this time and voiced good understanding of managing his care.     10/19/23 1316   Functional Status   Do you have any residual effects from the stroke Yes, I do   Weakness Yes   Weakness location L side, improving   Numbness No   Tingling No   Fatigue No   Dizziness or Light-Headedness No   Memory Concerns no memory concerns   Other concerns: None   Dependent ADLs: Independent   Please indicate your level of independence in the following activities: Walking Independent   Please indicate your level of independence in the following activities: Eating (including cutting food) Independent   Please indicate your level of independence in the following activities: Going up and down stairs Independent   Please indicate your level of independence in the following activities: Dressing and undressing (including shoes) Independent   Please indicate your level of independence in the following activities: Bathing/showering including grooming Independent   Please indicate your level of independence in the following activities: Using the toilet Independent   Resources and Support   What is your living situation right now? With family or significant other   Dependent IADLs: Independent   Current living arrangement: I live in a private home with spouse   What was your living situation before the stroke? With family or significant other   Are you currently doing any physical therapy? No   Are you currently doing any occupational therapy? No   Are you currently doing any speech therapy? No   Are you currently doing any other therapy? No   Do you get any home health services? No, not needed   Community Resources None   Informal Support system: Spouse;Family   Medications and Follow-up   How do you take your medications? Myself, from a pillbox that I set up   Do you sometimes miss or  forget to take your medications? How often? Rarely  (previous non-compliance)   Medication adherence problem (GOAL): Yes  (Previous non-compliance)   Effective medication organization strategy in place: Yes   Knowledgeable about how to use meds: Yes   Medication side effects suspected: No   Difficulty keeping appointments: No   Compliance Concerns No   No-Show Concerns No   No PCP office visit in Past Year No   Do you have the recommended follow-up scheduled? Yes   Risk Factor Management   Do you check your blood pressure at home? No, I have a blood pressure cuff but do not use it   Why Hard for me to remember to check it  (Patient states he can have wife check BP)   Do you check your blood sugar at home? No   Is there any second-hand smoke at home? No         Pt specific risk factors for stroke or TIA (transient ischemic attack):    Your Risk Factors Your Results Normal Ranges   High blood pressure  Less than 120/80   Cholesterol Total Lab Results   Component Value Date    CHOL 270 10/11/2023      Less than 150    Triglycerides Lab Results   Component Value Date    TRIG 189 10/11/2023    Less than 150   LDL Lab Results   Component Value Date     10/11/2023       Less than 70   HDL Lab Results   Component Value Date    HDL 53 10/11/2023         Greater than 40 (men)  Greater than 50 (women)   Diabetes Recent Labs   Lab Test 10/11/23  2325   A1C 6.1*    Less than 7   Smoking/tobacco use  Quit smoking and tobacco   Overweight  Lose 1-2 pounds a week   Lack of exercise  30 minutes moderate activity each day     Other risk factors include carotid (neck) artery disease, atrial fibrillation and stress. You may be on new medicine to treat high blood pressure, cholesterol, diabetes or atrial fibrillation.    Stroke warning signs and symptoms - CALL 911 right away for:  - Sudden numbness or weakness in the face, arm or leg (often on one side of the body).  - Sudden confusion or trouble understanding what is going  on.  - Sudden blurred or decreased vision in one or both eyes.  - Sudden trouble speaking, loss of balance, dizziness or problems with coordination.  - Sudden, severe headache for no reason.  - Fainting or seizures.  - Symptoms may go away then come back suddenly.    PLAN     Follow-up plan:  Provided pt with my contact information and encouraged him to call as needed for stroke related questions or concerns. No further planned outreaches at this time.    Aysha Rosas BS, RN, SCRN  RN Stroke Neurology Care Coordinator  [facility]

## 2023-11-01 NOTE — PROGRESS NOTES
__________________________________________________________    ___________________________________  ESTABLISHED PATIENT NEUROLOGY NOTE    DATE OF VISIT: 11/2/2023  MRN: [id number]  PATIENT NAME: Fredy Garza  YOB: 1965    Chief Complaint: No chief complaint on file.    SUBJECTIVE:                                                      History of Present Illness: Fredy Garza is a 58 year old male presenting for follow-up for Left thalamus with subacute hemorrhage.     He was hospitalized at [facility] on 10/11/23 - 10/14/23. Prior to the hospital stay, he had a past medical history of hypertension, hyperlipidemia, CVA in 2018, CKD stage III.    He presented to the hospital with bilateral leg weakness and numbness.     Stroke Evaluation summarized:     MRI and/or Head CT MR BRAIN W/O CONTRAST   IMPRESSION:  1.  Focus of susceptibility signal loss within the left thalamus has increased in size compared to the 2018 MRI and may reflect a focus of progressive subacute hemorrhage, potentially associated with an underlying cavernoma.  2.  New punctate focus of susceptibility signal loss within the medial right thalamus without associated edema or other findings to suggest acuity. This may reflect a new focus of chronic microhemorrhage.  3.  Background of generalized brain atrophy and multifocal chronic ischemic changes similar to the previous MRI.  4.  Mild inflammatory changes of the paranasal sinuses.     CT:  Multifocal chronic lacunar infarcts throughout the bilateral basal ganglia, deep white matter, and right thalamus.  Age-indeterminate infarct in the right corona radiata, new from the prior MRI.    CT 10/14/23: IMPRESSION:  1.  Fairly stable left thalamic hemorrhage.  2.  No new hemorrhage or midline shift.  3.  Advanced age related changes along with old lacunar infarcts basal ganglia and thalami bilaterally.   Intracranial Vasculature  Intracranial atherosclerosis, without evidence of a  proximal arterial occlusion    Cervical Vasculature 1.  Nonflow limiting atherosclerotic plaque at the carotid bifurcations.  2.  Dilatation of the ascending aorta, which may reflect an aortic aneurysm. Findings can be further evaluated with CTA of the chest      Echocardiogram Echocardiogram with 60 to 65% ejection fraction. Negative echo contrast bubble study at rest and with Valsalva    EKG/Telemetry Sinus rhythm   Septal infarct    Other Testing N/a         A1C 6.1%     11/02/23: Fredy presents to the clinic for poststroke follow-up.  Patient states that he presented to the hospital with left arm and bilateral lower extremity weakness and numbness.  He reports that his left arm and bilateral legs are 80% back to baseline.  He feels somewhat weak compared to prior to his stroke.  He is up ambulating independently without assistive device.  He denies any vision or speech changes or difficulty with swallowing.  He denies any reoccurrence of new strokelike symptoms since his hospitalization.    Antiplatelet therapy was put on hold due to hemorrhagic findings on imaging.  I will recheck CT imaging to determine if it is safe to start therapy.  Blood pressure is well controlled on lisinopril.  In clinic today it is 95/68.  LDL last 179.  Patient started on atorvastatin.  A1c is 6.1% we discussed prediabetes and lifestyle modifications to help lower this number.  No smoking.  Patient states that he snores and at times wakes up less rested.  He is not interested in a referral to the sleep clinic at this appointment.  We discussed looking into this further at his follow-up appointment in 6 months.       Current Prevention Medications:    Aspirin 81 mg on hold  Atorvastatin   lisinopril    Perceived Side Effects: No     Psycho-Social History: Fredy Garza currently lives [place] with his family. 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 Employment status: Data analysis    Patient  does not smoke, rare alcohol use, no recreational drug use.  Currently, patient denies feeling depressed, denies feeling anhedonia, denies suicidal  thoughts, and denies having feelings of excessive guilt/worthlessness.  We reviewed importance of mental and emotional wellbeing and impact on health.      Current Medications:   atorvastatin (LIPITOR) 80 MG tablet, [ATORVASTATIN (LIPITOR) 80 MG TABLET] Take 80 mg by mouth at bedtime.  lisinopriL (PRINIVIL,ZESTRIL) 30 MG tablet, [LISINOPRIL (PRINIVIL,ZESTRIL) 30 MG TABLET] Take 30 mg by mouth daily.  nitroglycerin (NITROSTAT) 0.4 MG SL tablet, [NITROGLYCERIN (NITROSTAT) 0.4 MG SL TABLET] Place 0.4 mg under the tongue every 5 (five) minutes as needed for chest pain.    No current facility-administered medications on file prior to visit.    Past Medical History:   Patient  has a past medical history of HTN (hypertension) and Hyperlipemia.  Surgical History:  He  has a past surgical history that includes other surgical history.  Family and Social History:  Reviewed, and he  reports that he has never smoked. He does not have any smokeless tobacco history on file. He reports that he does not drink alcohol.  Reviewed, and family history includes Stomach Cancer in his mother.    RECENT DIAGNOSTIC STUDIES:   Labs:    Coagulation studies:  Recent Labs   Lab Test 10/12/23  0402 10/11/23  2325 12/26/20  0755   INR 0.92 0.91 0.93        Lipid panel:  Recent Labs   Lab Test 10/11/23  2325 01/26/18  0541   CHOL 270* 223*   HDL 53 47   * 154*   TRIG 189* 112       HbA1C:  Recent Labs   Lab Test 10/11/23  2325   A1C 6.1*       Troponin:  No lab results found.  Recent Labs   Lab Test 12/26/20  1806 12/26/20  1254 12/26/20  0755   TROPONINI <0.01 <0.01 <0.01     No lab results found.    Imaging: See HPI     REVIEW OF SYSTEMS:                                                      10-point review of systems is negative except as mentioned above in HPI.    EXAM:                                                       Physical Exam:   Vitals: There were no vitals taken for this visit.  BMI= There is no height or weight on file to calculate BMI.  GENERAL: NAD.  HEENT: NC/AT.  PULM: Non-labored breathing.   Neurologic:  MENTAL STATUS: Alert, attentive. Speech is fluent. Normal comprehension. Normal concentration. Adequate fund of knowledge.   CRANIAL NERVES: Visual fields intact to confrontation. Pupils equally, round and reactive to light. Facial sensation and movement normal. EOM full. Hearing intact to conversation. Trapezius strength intact. Palate moves symmetrically. Tongue midline.  MOTOR: 5/5 in proximal and distal muscle groups of upper and lower extremities. Tone and bulk normal.   DTRs: Intact and symmetric in biceps, BR, triceps and patellae.   SENSATION: Normal light touch throughout.   COORDINATION: Normal finger nose finger. Finger tapping normal. Knee heel shin normal.  STATION AND GAIT: Romberg Negative. Good postural reflexes. Casual gait and tandem Normal  right hand-dominant.      ASSESSMENT and PLAN:                                                      Assessment:  No diagnosis found.    Fredy Garza is a 58 year old male presenting for follow-up for Left thalamus with subacute hemorrhage. He was hospitalized at [facility] on 10/11/23 - 10/14/23. Prior to the hospital stay, he had a past medical history of hypertension, hyperlipidemia, CVA in 2018, CKD stage III. He presented to the hospital with bilateral leg weakness and numbness.  Patient denies any reoccurrence of strokelike symptoms since his hospitalization.  He feels 80% back to baseline and is up walking independently without an assistive device.  He is not interested in physical therapy at this time.  I ordered updated head CT to determine when to restart antiplatelet therapy.  We discussed interventions outlined below we will plan to see the patient back in 6 months to establish care with neurologist.  Fredy understands and agrees with this plan.    Plan:     Diagnostic workup  - CT head ordered    Secondary prevention  -ASA on hold until updated CT imaging completed     Lifestyle  - mediterranean diet  - exercise    Risk Factors   Elevated blood Pressure  BP: 95/68  - Goal <130/80  - Continue preventive therapy: Lisinopril as prescribed     Elevated cholesterol levels   LDL: 179  - Goal < 70 mg/dl  - Continue preventive therapy: Atorvastatin as prescribed     Pre diabetes   A1c: 6.1%  - Goal: < 7.0%  - Continue preventive therapy: Cut down on carbohydrate intake.  Smaller portions of rice, pasta, breads, pastries etc    --- Plan to follow-up with your primary care provider to manage your vascular risk factors  --- Plan on follow up in the Neurology Clinic in 6 months.  --- Please feel free to reach out if you have any further questions or concerns.  --- Seek immediate medical attention if an emergency arises or if your health becomes progressively worse.     For any acute neurologic deficits he was advised to  go directly to the hospital rather than call the clinic.    It was a pleasure to meet you today!     Total Time: Total time spent for face to face visit, reviewing labs/imaging studies, counseling and coordination of care was: 32 Minutes spent on the date of the encounter doing chart review, review of test results, interpretation of tests, patient visit, and documentation     This note was dictated using voice recognition software.  Any grammatical or context distortions are unintentional and inherent to the software.    Subha Brian, DNP, APRN, CNP  [facility]

## 2023-11-02 ENCOUNTER — OFFICE VISIT (OUTPATIENT)
Dept: NEUROLOGY | Facility: CLINIC | Age: 58
End: 2023-11-02
Payer: COMMERCIAL

## 2023-11-02 VITALS
DIASTOLIC BLOOD PRESSURE: 68 MMHG | SYSTOLIC BLOOD PRESSURE: 95 MMHG | WEIGHT: 138 LBS | HEIGHT: 61 IN | HEART RATE: 69 BPM | BODY MASS INDEX: 26.06 KG/M2

## 2023-11-02 DIAGNOSIS — I61.9 THALAMIC HEMORRHAGE WITH STROKE (H): Primary | ICD-10-CM

## 2023-11-02 DIAGNOSIS — R53.1 LEFT-SIDED WEAKNESS: ICD-10-CM

## 2023-11-02 DIAGNOSIS — G45.9 TRANSIENT CEREBRAL ISCHEMIA, UNSPECIFIED TYPE: ICD-10-CM

## 2023-11-02 PROCEDURE — 99203 OFFICE O/P NEW LOW 30 MIN: CPT

## 2023-11-02 NOTE — LETTER
11/2/2023         RE: Fredy Garza  [address] [facility] [address]        Dear Colleague,    Thank you for referring your patient, Fredy Garza, to the [facility] [place]. Please see a copy of my visit note below.    __________________________________________________________    ___________________________________  ESTABLISHED PATIENT NEUROLOGY NOTE    DATE OF VISIT: 11/2/2023  MRN: [id number]  PATIENT NAME: Fredy Garza  YOB: 1965    Chief Complaint: No chief complaint on file.    SUBJECTIVE:                                                      History of Present Illness: Fredy Garza is a 58 year old male presenting for follow-up for Left thalamus with subacute hemorrhage.     He was hospitalized at [facility] on 10/11/23 - 10/14/23. Prior to the hospital stay, he had a past medical history of hypertension, hyperlipidemia, CVA in 2018, CKD stage III.    He presented to the hospital with bilateral leg weakness and numbness.     Stroke Evaluation summarized:     MRI and/or Head CT MR BRAIN W/O CONTRAST   IMPRESSION:  1.  Focus of susceptibility signal loss within the left thalamus has increased in size compared to the 2018 MRI and may reflect a focus of progressive subacute hemorrhage, potentially associated with an underlying cavernoma.  2.  New punctate focus of susceptibility signal loss within the medial right thalamus without associated edema or other findings to suggest acuity. This may reflect a new focus of chronic microhemorrhage.  3.  Background of generalized brain atrophy and multifocal chronic ischemic changes similar to the previous MRI.  4.  Mild inflammatory changes of the paranasal sinuses.     CT:  Multifocal chronic lacunar infarcts throughout the bilateral basal ganglia, deep white matter, and right thalamus.  Age-indeterminate infarct in the right corona radiata, new from the prior MRI.    CT 10/14/23: IMPRESSION:  1.  Fairly stable  left thalamic hemorrhage.  2.  No new hemorrhage or midline shift.  3.  Advanced age related changes along with old lacunar infarcts basal ganglia and thalami bilaterally.   Intracranial Vasculature  Intracranial atherosclerosis, without evidence of a proximal arterial occlusion    Cervical Vasculature 1.  Nonflow limiting atherosclerotic plaque at the carotid bifurcations.  2.  Dilatation of the ascending aorta, which may reflect an aortic aneurysm. Findings can be further evaluated with CTA of the chest      Echocardiogram Echocardiogram with 60 to 65% ejection fraction. Negative echo contrast bubble study at rest and with Valsalva    EKG/Telemetry Sinus rhythm   Septal infarct    Other Testing N/a         A1C 6.1%     11/02/23: Fredy presents to the clinic for poststroke follow-up.  Patient states that he presented to the hospital with left arm and bilateral lower extremity weakness and numbness.  He reports that his left arm and bilateral legs are 80% back to baseline.  He feels somewhat weak compared to prior to his stroke.  He is up ambulating independently without assistive device.  He denies any vision or speech changes or difficulty with swallowing.  He denies any reoccurrence of new strokelike symptoms since his hospitalization.    Antiplatelet therapy was put on hold due to hemorrhagic findings on imaging.  I will recheck CT imaging to determine if it is safe to start therapy.  Blood pressure is well controlled on lisinopril.  In clinic today it is 95/68.  LDL last 179.  Patient started on atorvastatin.  A1c is 6.1% we discussed prediabetes and lifestyle modifications to help lower this number.  No smoking.  Patient states that he snores and at times wakes up less rested.  He is not interested in a referral to the sleep clinic at this appointment.  We discussed looking into this further at his follow-up appointment in 6 months.       Current Prevention Medications:    Aspirin 81 mg on  hold  Atorvastatin   lisinopril    Perceived Side Effects: No     Psycho-Social History: Fredy Garza currently lives [place] with his family. 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 Employment status: Data analysis    Patient does not smoke, rare alcohol use, no recreational drug use.  Currently, patient denies feeling depressed, denies feeling anhedonia, denies suicidal  thoughts, and denies having feelings of excessive guilt/worthlessness.  We reviewed importance of mental and emotional wellbeing and impact on health.      Current Medications:   atorvastatin (LIPITOR) 80 MG tablet, [ATORVASTATIN (LIPITOR) 80 MG TABLET] Take 80 mg by mouth at bedtime.  lisinopriL (PRINIVIL,ZESTRIL) 30 MG tablet, [LISINOPRIL (PRINIVIL,ZESTRIL) 30 MG TABLET] Take 30 mg by mouth daily.  nitroglycerin (NITROSTAT) 0.4 MG SL tablet, [NITROGLYCERIN (NITROSTAT) 0.4 MG SL TABLET] Place 0.4 mg under the tongue every 5 (five) minutes as needed for chest pain.    No current facility-administered medications on file prior to visit.    Past Medical History:   Patient  has a past medical history of HTN (hypertension) and Hyperlipemia.  Surgical History:  He  has a past surgical history that includes other surgical history.  Family and Social History:  Reviewed, and he  reports that he has never smoked. He does not have any smokeless tobacco history on file. He reports that he does not drink alcohol.  Reviewed, and family history includes Stomach Cancer in his mother.    RECENT DIAGNOSTIC STUDIES:   Labs:    Coagulation studies:  Recent Labs   Lab Test 10/12/23  0402 10/11/23  2325 12/26/20  0755   INR 0.92 0.91 0.93        Lipid panel:  Recent Labs   Lab Test 10/11/23  2325 01/26/18  0541   CHOL 270* 223*   HDL 53 47   * 154*   TRIG 189* 112       HbA1C:  Recent Labs   Lab Test 10/11/23  2325   A1C 6.1*       Troponin:  No lab results found.  Recent Labs   Lab Test 12/26/20  1806 12/26/20  1254  12/26/20  0755   TROPONINI <0.01 <0.01 <0.01     No lab results found.    Imaging: See HPI     REVIEW OF SYSTEMS:                                                      10-point review of systems is negative except as mentioned above in HPI.    EXAM:                                                      Physical Exam:   Vitals: There were no vitals taken for this visit.  BMI= There is no height or weight on file to calculate BMI.  GENERAL: NAD.  HEENT: NC/AT.  PULM: Non-labored breathing.   Neurologic:  MENTAL STATUS: Alert, attentive. Speech is fluent. Normal comprehension. Normal concentration. Adequate fund of knowledge.   CRANIAL NERVES: Visual fields intact to confrontation. Pupils equally, round and reactive to light. Facial sensation and movement normal. EOM full. Hearing intact to conversation. Trapezius strength intact. Palate moves symmetrically. Tongue midline.  MOTOR: 5/5 in proximal and distal muscle groups of upper and lower extremities. Tone and bulk normal.   DTRs: Intact and symmetric in biceps, BR, triceps and patellae.   SENSATION: Normal light touch throughout.   COORDINATION: Normal finger nose finger. Finger tapping normal. Knee heel shin normal.  STATION AND GAIT: Romberg Negative. Good postural reflexes. Casual gait and tandem Normal  right hand-dominant.      ASSESSMENT and PLAN:                                                      Assessment:  No diagnosis found.    Fredy Garza is a 58 year old male presenting for follow-up for Left thalamus with subacute hemorrhage. He was hospitalized at [facility] on 10/11/23 - 10/14/23. Prior to the hospital stay, he had a past medical history of hypertension, hyperlipidemia, CVA in 2018, CKD stage III. He presented to the hospital with bilateral leg weakness and numbness.  Patient denies any reoccurrence of strokelike symptoms since his hospitalization.  He feels 80% back to baseline and is up walking independently without an assistive  device.  He is not interested in physical therapy at this time.  I ordered updated head CT to determine when to restart antiplatelet therapy.  We discussed interventions outlined below we will plan to see the patient back in 6 months to establish care with neurologist. Fredy understands and agrees with this plan.    Plan:     Diagnostic workup  - CT head ordered    Secondary prevention  -ASA on hold until updated CT imaging completed     Lifestyle  - mediterranean diet  - exercise    Risk Factors   Elevated blood Pressure  BP: 95/68  - Goal <130/80  - Continue preventive therapy: Lisinopril as prescribed     Elevated cholesterol levels   LDL: 179  - Goal < 70 mg/dl  - Continue preventive therapy: Atorvastatin as prescribed     Pre diabetes   A1c: 6.1%  - Goal: < 7.0%  - Continue preventive therapy: Cut down on carbohydrate intake.  Smaller portions of rice, pasta, breads, pastries etc    --- Plan to follow-up with your primary care provider to manage your vascular risk factors  --- Plan on follow up in the Neurology Clinic in 6 months.  --- Please feel free to reach out if you have any further questions or concerns.  --- Seek immediate medical attention if an emergency arises or if your health becomes progressively worse.     For any acute neurologic deficits he was advised to  go directly to the hospital rather than call the clinic.    It was a pleasure to meet you today!     Total Time: Total time spent for face to face visit, reviewing labs/imaging studies, counseling and coordination of care was: 32 Minutes spent on the date of the encounter doing chart review, review of test results, interpretation of tests, patient visit, and documentation     This note was dictated using voice recognition software.  Any grammatical or context distortions are unintentional and inherent to the software.    Subha Brian, DNP, APRN, CNP  [facility]         Again, thank you for allowing me to participate in the care  of your patient.        Sincerely,        LARRY Bowie CNP

## 2023-11-02 NOTE — PATIENT INSTRUCTIONS
Plan:     Diagnostic workup  - CT head ordered    Secondary prevention  -ASA on hold until updated CT imaging completed     Lifestyle  - mediterranean diet  - exercise    Risk Factors   Elevated blood Pressure  BP: 95/68  - Goal <130/80  - Continue preventive therapy: Lisinopril as prescribed     Elevated cholesterol levels   LDL: 179  - Goal < 70 mg/dl  - Continue preventive therapy: Atorvastatin as prescribed     Pre diabetes   A1c: 6.1%  - Goal: < 7.0%  - Continue preventive therapy: Cut down on carbohydrate intake.  Smaller portions of rice, pasta, breads, pastries etc    --- Plan to follow-up with your primary care provider to manage your vascular risk factors  --- Plan on follow up in the Neurology Clinic in 6 months.  --- Please feel free to reach out if you have any further questions or concerns.  --- Seek immediate medical attention if an emergency arises or if your health becomes progressively worse.     For any acute neurologic deficits he was advised to  go directly to the hospital rather than call the clinic.    It was a pleasure to meet you today!

## 2023-11-02 NOTE — NURSING NOTE
Chief Complaint   Patient presents with    Stroke     No concerns     Lizz Baum on 11/2/2023 at 9:05 AM

## 2023-11-07 PROCEDURE — 99285 EMERGENCY DEPT VISIT HI MDM: CPT | Mod: 25

## 2023-11-08 ENCOUNTER — HOSPITAL ENCOUNTER (INPATIENT)
Facility: HOSPITAL | Age: 58
LOS: 1 days | Discharge: HOME | End: 2023-11-08
Attending: EMERGENCY MEDICINE | Admitting: HOSPITALIST
Payer: COMMERCIAL

## 2023-11-08 ENCOUNTER — APPOINTMENT (OUTPATIENT)
Dept: SPEECH THERAPY | Facility: HOSPITAL | Age: 58
End: 2023-11-08
Attending: HOSPITALIST
Payer: COMMERCIAL

## 2023-11-08 ENCOUNTER — APPOINTMENT (OUTPATIENT)
Dept: RADIOLOGY | Facility: HOSPITAL | Age: 58
End: 2023-11-08
Attending: EMERGENCY MEDICINE
Payer: COMMERCIAL

## 2023-11-08 ENCOUNTER — APPOINTMENT (OUTPATIENT)
Dept: MRI IMAGING | Facility: HOSPITAL | Age: 58
End: 2023-11-08
Attending: EMERGENCY MEDICINE
Payer: COMMERCIAL

## 2023-11-08 ENCOUNTER — APPOINTMENT (OUTPATIENT)
Dept: CT IMAGING | Facility: HOSPITAL | Age: 58
End: 2023-11-08
Attending: EMERGENCY MEDICINE
Payer: COMMERCIAL

## 2023-11-08 ENCOUNTER — APPOINTMENT (OUTPATIENT)
Dept: OCCUPATIONAL THERAPY | Facility: HOSPITAL | Age: 58
End: 2023-11-08
Attending: HOSPITALIST
Payer: COMMERCIAL

## 2023-11-08 VITALS
BODY MASS INDEX: 26.07 KG/M2 | HEART RATE: 58 BPM | RESPIRATION RATE: 21 BRPM | TEMPERATURE: 97.9 F | SYSTOLIC BLOOD PRESSURE: 124 MMHG | HEIGHT: 61 IN | DIASTOLIC BLOOD PRESSURE: 85 MMHG | OXYGEN SATURATION: 96 %

## 2023-11-08 DIAGNOSIS — I63.9 ACUTE ISCHEMIC STROKE (H): Primary | ICD-10-CM

## 2023-11-08 DIAGNOSIS — R53.1 LEFT-SIDED WEAKNESS: ICD-10-CM

## 2023-11-08 DIAGNOSIS — Z86.73 HISTORY OF TIA (TRANSIENT ISCHEMIC ATTACK) AND STROKE: ICD-10-CM

## 2023-11-08 PROBLEM — I10 ESSENTIAL HYPERTENSION: Status: ACTIVE | Noted: 2023-11-08

## 2023-11-08 LAB
ALBUMIN SERPL BCG-MCNC: 3.6 G/DL (ref 3.5–5.2)
ALBUMIN UR-MCNC: NEGATIVE MG/DL
ALP SERPL-CCNC: 205 U/L (ref 40–129)
ALT SERPL W P-5'-P-CCNC: 90 U/L (ref 0–70)
ANION GAP SERPL CALCULATED.3IONS-SCNC: 8 MMOL/L (ref 7–15)
APPEARANCE UR: CLEAR
APTT PPP: 34 SECONDS (ref 22–38)
AST SERPL W P-5'-P-CCNC: 57 U/L (ref 0–45)
BASOPHILS # BLD AUTO: 0.1 10E3/UL (ref 0–0.2)
BASOPHILS NFR BLD AUTO: 1 %
BILIRUB DIRECT SERPL-MCNC: <0.2 MG/DL (ref 0–0.3)
BILIRUB SERPL-MCNC: 0.5 MG/DL
BILIRUB UR QL STRIP: NEGATIVE
BUN SERPL-MCNC: 21.5 MG/DL (ref 6–20)
CALCIUM SERPL-MCNC: 9.4 MG/DL (ref 8.6–10)
CHLORIDE SERPL-SCNC: 103 MMOL/L (ref 98–107)
CHOLEST SERPL-MCNC: 131 MG/DL
COLOR UR AUTO: COLORLESS
CREAT SERPL-MCNC: 1.38 MG/DL (ref 0.67–1.17)
DEPRECATED HCO3 PLAS-SCNC: 25 MMOL/L (ref 22–29)
EGFRCR SERPLBLD CKD-EPI 2021: 60 ML/MIN/1.73M2
EOSINOPHIL # BLD AUTO: 0.2 10E3/UL (ref 0–0.7)
EOSINOPHIL NFR BLD AUTO: 2 %
ERYTHROCYTE [DISTWIDTH] IN BLOOD BY AUTOMATED COUNT: 11.7 % (ref 10–15)
FLUAV RNA SPEC QL NAA+PROBE: NEGATIVE
FLUBV RNA RESP QL NAA+PROBE: NEGATIVE
GLUCOSE BLDC GLUCOMTR-MCNC: 134 MG/DL (ref 70–99)
GLUCOSE BLDC GLUCOMTR-MCNC: 96 MG/DL (ref 70–99)
GLUCOSE SERPL-MCNC: 103 MG/DL (ref 70–99)
GLUCOSE UR STRIP-MCNC: NEGATIVE MG/DL
HCT VFR BLD AUTO: 40.2 % (ref 40–53)
HDLC SERPL-MCNC: 30 MG/DL
HGB BLD-MCNC: 13.6 G/DL (ref 13.3–17.7)
HGB UR QL STRIP: NEGATIVE
IMM GRANULOCYTES # BLD: 0 10E3/UL
IMM GRANULOCYTES NFR BLD: 0 %
INR PPP: 1.04 (ref 0.85–1.15)
KETONES UR STRIP-MCNC: NEGATIVE MG/DL
LDLC SERPL CALC-MCNC: 84 MG/DL
LEUKOCYTE ESTERASE UR QL STRIP: NEGATIVE
LYMPHOCYTES # BLD AUTO: 2.7 10E3/UL (ref 0.8–5.3)
LYMPHOCYTES NFR BLD AUTO: 34 %
MCH RBC QN AUTO: 30.2 PG (ref 26.5–33)
MCHC RBC AUTO-ENTMCNC: 33.8 G/DL (ref 31.5–36.5)
MCV RBC AUTO: 89 FL (ref 78–100)
MONOCYTES # BLD AUTO: 0.5 10E3/UL (ref 0–1.3)
MONOCYTES NFR BLD AUTO: 7 %
NEUTROPHILS # BLD AUTO: 4.6 10E3/UL (ref 1.6–8.3)
NEUTROPHILS NFR BLD AUTO: 56 %
NITRATE UR QL: NEGATIVE
NONHDLC SERPL-MCNC: 101 MG/DL
NRBC # BLD AUTO: 0 10E3/UL
NRBC BLD AUTO-RTO: 0 /100
PH UR STRIP: 6 [PH] (ref 5–7)
PLATELET # BLD AUTO: 293 10E3/UL (ref 150–450)
POTASSIUM SERPL-SCNC: 4.1 MMOL/L (ref 3.4–5.3)
PROT SERPL-MCNC: 7.3 G/DL (ref 6.4–8.3)
RBC # BLD AUTO: 4.5 10E6/UL (ref 4.4–5.9)
RBC URINE: <1 /HPF
RSV RNA SPEC NAA+PROBE: NEGATIVE
SARS-COV-2 RNA RESP QL NAA+PROBE: NEGATIVE
SODIUM SERPL-SCNC: 136 MMOL/L (ref 135–145)
SP GR UR STRIP: 1.02 (ref 1–1.03)
TRIGL SERPL-MCNC: 87 MG/DL
TROPONIN T SERPL HS-MCNC: <6 NG/L
URATE SERPL-MCNC: 6.6 MG/DL (ref 3.4–7)
UROBILINOGEN UR STRIP-MCNC: <2 MG/DL
WBC # BLD AUTO: 8.2 10E3/UL (ref 4–11)
WBC URINE: <1 /HPF

## 2023-11-08 PROCEDURE — 70496 CT ANGIOGRAPHY HEAD: CPT

## 2023-11-08 PROCEDURE — 97535 SELF CARE MNGMENT TRAINING: CPT | Mod: GO

## 2023-11-08 PROCEDURE — 99236 HOSP IP/OBS SAME DATE HI 85: CPT | Performed by: HOSPITALIST

## 2023-11-08 PROCEDURE — 85025 COMPLETE CBC W/AUTO DIFF WBC: CPT | Performed by: EMERGENCY MEDICINE

## 2023-11-08 PROCEDURE — 70553 MRI BRAIN STEM W/O & W/DYE: CPT

## 2023-11-08 PROCEDURE — 97165 OT EVAL LOW COMPLEX 30 MIN: CPT | Mod: GO

## 2023-11-08 PROCEDURE — 80061 LIPID PANEL: CPT | Performed by: HOSPITALIST

## 2023-11-08 PROCEDURE — 85610 PROTHROMBIN TIME: CPT | Performed by: EMERGENCY MEDICINE

## 2023-11-08 PROCEDURE — 250N000011 HC RX IP 250 OP 636: Mod: JZ | Performed by: EMERGENCY MEDICINE

## 2023-11-08 PROCEDURE — 82248 BILIRUBIN DIRECT: CPT | Performed by: HOSPITALIST

## 2023-11-08 PROCEDURE — 84550 ASSAY OF BLOOD/URIC ACID: CPT | Performed by: HOSPITALIST

## 2023-11-08 PROCEDURE — 82962 GLUCOSE BLOOD TEST: CPT

## 2023-11-08 PROCEDURE — 71046 X-RAY EXAM CHEST 2 VIEWS: CPT

## 2023-11-08 PROCEDURE — 70498 CT ANGIOGRAPHY NECK: CPT

## 2023-11-08 PROCEDURE — 80053 COMPREHEN METABOLIC PANEL: CPT | Performed by: EMERGENCY MEDICINE

## 2023-11-08 PROCEDURE — 92610 EVALUATE SWALLOWING FUNCTION: CPT | Mod: GN

## 2023-11-08 PROCEDURE — 87637 SARSCOV2&INF A&B&RSV AMP PRB: CPT | Performed by: EMERGENCY MEDICINE

## 2023-11-08 PROCEDURE — 255N000002 HC RX 255 OP 636: Mod: JZ | Performed by: EMERGENCY MEDICINE

## 2023-11-08 PROCEDURE — 120N000001 HC R&B MED SURG/OB

## 2023-11-08 PROCEDURE — 84484 ASSAY OF TROPONIN QUANT: CPT | Performed by: EMERGENCY MEDICINE

## 2023-11-08 PROCEDURE — 93005 ELECTROCARDIOGRAM TRACING: CPT | Performed by: EMERGENCY MEDICINE

## 2023-11-08 PROCEDURE — 85730 THROMBOPLASTIN TIME PARTIAL: CPT | Performed by: EMERGENCY MEDICINE

## 2023-11-08 PROCEDURE — 99254 IP/OBS CNSLTJ NEW/EST MOD 60: CPT | Performed by: STUDENT IN AN ORGANIZED HEALTH CARE EDUCATION/TRAINING PROGRAM

## 2023-11-08 PROCEDURE — 81001 URINALYSIS AUTO W/SCOPE: CPT | Performed by: EMERGENCY MEDICINE

## 2023-11-08 PROCEDURE — 250N000013 HC RX MED GY IP 250 OP 250 PS 637: Performed by: HOSPITALIST

## 2023-11-08 PROCEDURE — 36415 COLL VENOUS BLD VENIPUNCTURE: CPT | Performed by: EMERGENCY MEDICINE

## 2023-11-08 PROCEDURE — A9585 GADOBUTROL INJECTION: HCPCS | Mod: JZ | Performed by: EMERGENCY MEDICINE

## 2023-11-08 RX ORDER — ASPIRIN 325 MG
325 TABLET ORAL DAILY
Status: DISCONTINUED | OUTPATIENT
Start: 2023-11-09 | End: 2023-11-08

## 2023-11-08 RX ORDER — ASPIRIN 81 MG/1
81 TABLET ORAL DAILY
Status: DISCONTINUED | OUTPATIENT
Start: 2023-11-09 | End: 2023-11-08 | Stop reason: HOSPADM

## 2023-11-08 RX ORDER — HYDRALAZINE HYDROCHLORIDE 20 MG/ML
10-20 INJECTION INTRAMUSCULAR; INTRAVENOUS
Status: DISCONTINUED | OUTPATIENT
Start: 2023-11-08 | End: 2023-11-08 | Stop reason: HOSPADM

## 2023-11-08 RX ORDER — EZETIMIBE 10 MG/1
10 TABLET ORAL AT BEDTIME
Status: DISCONTINUED | OUTPATIENT
Start: 2023-11-08 | End: 2023-11-08 | Stop reason: HOSPADM

## 2023-11-08 RX ORDER — EZETIMIBE 10 MG/1
10 TABLET ORAL AT BEDTIME
Qty: 30 TABLET | Refills: 3 | Status: SHIPPED | OUTPATIENT
Start: 2023-11-08

## 2023-11-08 RX ORDER — ATORVASTATIN CALCIUM 40 MG/1
80 TABLET, FILM COATED ORAL AT BEDTIME
Status: DISCONTINUED | OUTPATIENT
Start: 2023-11-08 | End: 2023-11-08 | Stop reason: HOSPADM

## 2023-11-08 RX ORDER — IOPAMIDOL 755 MG/ML
75 INJECTION, SOLUTION INTRAVASCULAR ONCE
Status: COMPLETED | OUTPATIENT
Start: 2023-11-08 | End: 2023-11-08

## 2023-11-08 RX ORDER — SODIUM CHLORIDE 9 MG/ML
INJECTION, SOLUTION INTRAVENOUS CONTINUOUS PRN
Status: ACTIVE | OUTPATIENT
Start: 2023-11-08 | End: 2023-11-08

## 2023-11-08 RX ORDER — ONDANSETRON 4 MG/1
4 TABLET, ORALLY DISINTEGRATING ORAL EVERY 6 HOURS PRN
Status: DISCONTINUED | OUTPATIENT
Start: 2023-11-08 | End: 2023-11-08 | Stop reason: HOSPADM

## 2023-11-08 RX ORDER — ASPIRIN 81 MG/1
81 TABLET ORAL DAILY
Qty: 30 TABLET | Refills: 11 | Status: SHIPPED | OUTPATIENT
Start: 2023-11-09

## 2023-11-08 RX ORDER — ASPIRIN 81 MG/1
81 TABLET ORAL DAILY
Status: DISCONTINUED | OUTPATIENT
Start: 2023-11-08 | End: 2023-11-08

## 2023-11-08 RX ORDER — ASPIRIN 325 MG
325 TABLET ORAL ONCE
Status: COMPLETED | OUTPATIENT
Start: 2023-11-08 | End: 2023-11-08

## 2023-11-08 RX ORDER — NITROGLYCERIN 0.4 MG/1
0.4 TABLET SUBLINGUAL EVERY 5 MIN PRN
Status: DISCONTINUED | OUTPATIENT
Start: 2023-11-08 | End: 2023-11-08 | Stop reason: HOSPADM

## 2023-11-08 RX ORDER — ASPIRIN 81 MG/1
81 TABLET, CHEWABLE ORAL DAILY
Status: DISCONTINUED | OUTPATIENT
Start: 2023-11-08 | End: 2023-11-08

## 2023-11-08 RX ORDER — LIDOCAINE 40 MG/G
CREAM TOPICAL
Status: DISCONTINUED | OUTPATIENT
Start: 2023-11-08 | End: 2023-11-08 | Stop reason: HOSPADM

## 2023-11-08 RX ORDER — GADOBUTROL 604.72 MG/ML
6.5 INJECTION INTRAVENOUS ONCE
Status: COMPLETED | OUTPATIENT
Start: 2023-11-08 | End: 2023-11-08

## 2023-11-08 RX ORDER — ACETAMINOPHEN 325 MG/1
650 TABLET ORAL EVERY 4 HOURS PRN
Status: DISCONTINUED | OUTPATIENT
Start: 2023-11-08 | End: 2023-11-08 | Stop reason: HOSPADM

## 2023-11-08 RX ORDER — LABETALOL HYDROCHLORIDE 5 MG/ML
10-20 INJECTION, SOLUTION INTRAVENOUS EVERY 10 MIN PRN
Status: DISCONTINUED | OUTPATIENT
Start: 2023-11-08 | End: 2023-11-08 | Stop reason: HOSPADM

## 2023-11-08 RX ORDER — ONDANSETRON 2 MG/ML
4 INJECTION INTRAMUSCULAR; INTRAVENOUS EVERY 6 HOURS PRN
Status: DISCONTINUED | OUTPATIENT
Start: 2023-11-08 | End: 2023-11-08 | Stop reason: HOSPADM

## 2023-11-08 RX ADMIN — IOPAMIDOL 75 ML: 755 INJECTION, SOLUTION INTRAVENOUS at 00:22

## 2023-11-08 RX ADMIN — GADOBUTROL 6.5 ML: 604.72 INJECTION INTRAVENOUS at 02:03

## 2023-11-08 RX ADMIN — ASPIRIN 325 MG ORAL TABLET 325 MG: 325 PILL ORAL at 04:10

## 2023-11-08 ASSESSMENT — ACTIVITIES OF DAILY LIVING (ADL)
ADLS_ACUITY_SCORE: 39
ADLS_ACUITY_SCORE: 35
DEPENDENT_IADLS:: INDEPENDENT
ADLS_ACUITY_SCORE: 39
ADLS_ACUITY_SCORE: 39
ADLS_ACUITY_SCORE: 35
ADLS_ACUITY_SCORE: 39

## 2023-11-08 NOTE — CONSULTS
"Care Management Initial Consult    General Information  Assessment completed with: Patient, Spouse or significant other, Fredy and wife Yulissa  Type of CM/SW Visit: Initial Assessment    Primary Care Provider verified and updated as needed: Yes   Readmission within the last 30 days: no previous admission in last 30 days      Reason for Consult: discharge planning  Advance Care Planning: Advance Care Planning Reviewed: no concerns identified          Communication Assessment  Patient's communication style: spoken language (English or Bilingual)             Cognitive  Cognitive/Neuro/Behavioral: WDL    Living Environment:   People in home: spouse, child(amelia), adult  Fredy and wife Yulissa and 2 adult kids  Current living Arrangements: house      Able to return to prior arrangements: yes       Family/Social Support:  Care provided by:    Provides care for: no one  Marital Status:   Wife, Children  Yulissa       Description of Support System: Supportive, Involved    Support Assessment: Adequate family and caregiver support, Adequate social supports, Patient communicates needs well met    Current Resources:   Patient receiving home care services: No     Community Resources: None  Equipment currently used at home: none  Supplies currently used at home: Other (\"glasses\")    Employment/Financial:  Employment Status: employed full-time     Employment/ Comments: \"no  history\"  Financial Concerns:     Referral to Financial Worker: No       Does the patient's insurance plan have a 3 day qualifying hospital stay waiver?  No    Lifestyle & Psychosocial Needs:  Social Determinants of Health     Food Insecurity: Not on file   Depression: Not at risk (11/2/2023)    PHQ-2     PHQ-2 Score: 0   Housing Stability: Not on file   Tobacco Use: Unknown (11/2/2023)    Patient History     Smoking Tobacco Use: Never     Smokeless Tobacco Use: Unknown     Passive Exposure: Not on file   Financial Resource Strain: Not on file   Alcohol " Use: Not on file   Transportation Needs: Not on file   Physical Activity: Not on file   Interpersonal Safety: Not on file   Stress: Not on file   Social Connections: Not on file       Functional Status:  Prior to admission patient needed assistance:   Dependent ADLs:: Independent, Ambulation-no assistive device  Dependent IADLs:: Independent  Assesssment of Functional Status: At functional baseline    Mental Health Status:          Chemical Dependency Status:                Values/Beliefs:  Spiritual, Cultural Beliefs, Jewish Practices, Values that affect care:                 Additional Information:  Fredy lives in a house with his wife Yulissa and 2 adult kids.     He is independent with ADLs and IADLs. He drives and works full time.    Unknown discharge needs at this time, awaiting PT/OT recommendations.     Family to transport at discharge.    CM to follow for medical progression of care, discharge recommendations, and final discharge plan.    Lori Mejía RN

## 2023-11-08 NOTE — CONSULTS
"      [facility]    Stroke Consult Note    Reason for Consult: Stroke Code     Chief Complaint: Stroke Symptoms      HPI  Fredy Garza is a 58 year old male with recent right basal ganglia infarct s/p TNK and hemorrhagic conversion with residual left sided weakness presented with left sided worsening of symptoms at 8 pm on 11/7/23.    Imaging Findings  CTH; No Hemorrhage  CTA head and neck: No LVO    Intravenous Thrombolysis  Not given due to:   - head trauma/stroke within the past 3 months    Endovascular Treatment  Not initiated due to absence of proximal vessel occlusion    Impression   #Worsening of prior deficits vs New Acute ischemic stroke    Recommendations  - MRI Brain w/w/o contrast.  - Recommend toxic , metabolic work up to evaluate for systemic etiology.    Updated recommendations:  - MRI Brain shows new right posterior limb Internal Capsule infarct and tiny left  basal ganglia infarct  - recommend starting  mg.    Patient Follow-up     - final recommendation pending work-up    Thank you for this consult. We will continue to follow.      The Stroke Staff is LINDSAY Padilla .    Luanne Chew MD  Vascular Neurology Fellow    To page me or covering stroke neurology team member, click here: AMCOM  Choose \"On Call\" tab at top, then select \"NEUROLOGY/ALL SITES\" from middle drop-down box, press Enter, then look for \"stroke\" or \"telestroke\" for your site.  _  Stroke Code Data Data   Stroke Code Data  (for stroke code with tele)  Stroke code activated 11/08/23   0004   First stroke provider response 11/08/23   0005   Video start time         Video end time         Last known normal 11/07/23 2000   Time of discovery  (or onset of symptoms)  11/07/23 2000   Head CT read by Stroke Neuro Dr/Provider 11/08/23   0020   Was stroke code de-escalated? Yes 11/08/23 0031           Telestroke Service Details  Type of service telemedicine diagnostic assessment of acute " neurological changes   Reason telemedicine is appropriate patient requires assessment with a specialist for diagnosis and treatment of neurological symptoms   Mode of transmission secure interactive audio and video communication per Steve   Originating site (patient location) [facility]    Distant site (provider location) Provider remote site

## 2023-11-08 NOTE — PLAN OF CARE
Problem: Adult Inpatient Plan of Care  Goal: Optimal Comfort and Wellbeing  Outcome: Progressing     Problem: Comorbidity Management  Goal: Blood Pressure in Desired Range  Outcome: Progressing     Problem: Stroke, Intracerebral Hemorrhage  Goal: Optimal Pain Control and Function  Outcome: Progressing     Problem: Stroke, Intracerebral Hemorrhage  Goal: Improved Sensorimotor Function  Outcome: Progressing   Goal Outcome Evaluation:    Assigned patient at 0400. Alert and oriented x4. Able to make needs known. Denies pain. Patient in with acute stroke. NIH score of 0. Denies numbness and tingling. Endorses weakness on left side. L hand  moderate, R hand  strong. Other assessment WNL. Patient's spouse at bedside. Urinal within reach. Call light within reach. Advised to not get up without help due to weakness.Tele: NSR.

## 2023-11-08 NOTE — DISCHARGE SUMMARY
[facility] MEDICINE  DISCHARGE SUMMARY     Primary Care Physician: Zain Roca  Admission Date: 11/8/2023   Discharge Provider: Matilde Nuñez MD Discharge Date: 11/8/2023   Diet:   Active Diet and Nourishment Order   Procedures     Combination Diet Moderate Consistent Carb (60 g CHO per Meal) Diet; Low Saturated Fat Na <2400mg Diet     Diet       Code Status: Full Code   Activity: DCACTIVITY: Activity as tolerated        Condition at Discharge: Stable     REASON FOR PRESENTATION(See Admission Note for Details)     weakness    PRINCIPAL & ACTIVE DISCHARGE DIAGNOSES     Principal Problem:    Acute ischemic stroke (H)  Active Problems:    Hyperlipemia    CKD (chronic kidney disease) stage 3, GFR 30-59 ml/min (H)    Left-sided weakness    Essential hypertension      PENDING LABS     Unresulted Labs Ordered in the Past 30 Days of this Admission       No orders found from 10/9/2023 to 11/9/2023.              PROCEDURES ( this hospitalization only)          RECOMMENDATIONS TO OUTPATIENT PROVIDER FOR F/U VISIT     Follow-up Appointments     Follow-up and recommended labs and tests       Follow up with primary care provider, Zain Roca, within 7 days for   hospital follow- up.  The following labs/tests are recommended: Lipid   panel in 4-6 weeks.  Continue to monitor blood sugars and blood pressures   with primary care.              DISPOSITION     Home    SUMMARY OF HOSPITAL COURSE:      58M with CVA, HLD, pre-DM, recent admission for who presents with L sided numbness and tingling and found to have R internal capsule ischemic CVA.  Symptoms resolved and will be discharged to home.  Has significant ischemic burden for his age and needs aggressive risk factor modification.     #Acute ischemic CVA   #Recent R internal capsule hemorrhagic CVA which may have been HTNive bleed vs. Hemorrhagic transformation  -d/w neurology and most consistent with small vessel disease.  Also with intra-cranial  athero.  Needs aggressive risk factor management  -asa 81.  Had it not been for recent hemorrhage would have been on DAPT.  Consider DAPT in follow-up with neurology.    #HTN - lisinopril.  Checks BP at home and readings are good.    #HLD  -last LDL 170s in setting of non compliance with statin.  Goal LDL < 70.   Has been compliant since last admission and repeat LDL now 80s.    -Continue lipitor 80, add zetia.    -If not at goal as outpatient would transition to PCSK9-I      #pre-DM2.  Lifestyle modification and outpatient monitoring.  Consider metformin if A1c approaching 6.5 or metabolic profile not impoving with lifestyle changes.  discussed mediterranean diet and exercise.    #Alcohol misuse - stop EtOH     #CKD3 - baseline.    Discharge Medications with Med changes:     Current Discharge Medication List        START taking these medications    Details   aspirin 81 MG EC tablet Take 1 tablet (81 mg) by mouth daily  Qty: 30 tablet, Refills: 11    Associated Diagnoses: Acute ischemic stroke (H)      ezetimibe (ZETIA) 10 MG tablet Take 1 tablet (10 mg) by mouth at bedtime  Qty: 30 tablet, Refills: 3    Associated Diagnoses: Acute ischemic stroke (H)           CONTINUE these medications which have NOT CHANGED    Details   atorvastatin (LIPITOR) 80 MG tablet [ATORVASTATIN (LIPITOR) 80 MG TABLET] Take 80 mg by mouth at bedtime.      lisinopriL (PRINIVIL,ZESTRIL) 30 MG tablet Take 30 mg by mouth at bedtime      nitroglycerin (NITROSTAT) 0.4 MG SL tablet [NITROGLYCERIN (NITROSTAT) 0.4 MG SL TABLET] Place 0.4 mg under the tongue every 5 (five) minutes as needed for chest pain.                   Rationale for medication changes:      As above        Consults     NEUROLOGY IP STROKE CONSULT  SPEECH LANGUAGE PATH ADULT IP CONSULT  PHARMACY IP CONSULT  PHARMACY IP CONSULT  PHARMACY IP CONSULT  PHYSICAL THERAPY ADULT IP CONSULT  OCCUPATIONAL THERAPY ADULT IP CONSULT  REHAB ADMISSIONS LIAISON IP CONSULT  CARE MANAGEMENT /  SOCIAL WORK IP CONSULT  NEUROLOGY IP CONSULT    Immunizations given this encounter     Most Recent Immunizations   Administered Date(s) Administered     COVID-19 MONOVALENT 12+ ([employer]) 04/21/2021           Anticoagulation Information            SIGNIFICANT IMAGING FINDINGS     Results for orders placed or performed during the hospital encounter of 11/08/23   CTA Head Neck with Contrast    Impression    IMPRESSION:   HEAD CT:  1.  No CT evidence of acute intracranial hemorrhage or recent transcortical infarct.  2.  Multiple chronic-appearing infarcts in the thalami, basal ganglia and deep white matter.  3.  Underlying mild to moderate presumed chronic small vessel ischemic changes.    HEAD CTA:   1.  No large vessel occlusion or flow-limiting stenosis.  2.  Evidence of intracranial atherosclerosis.    NECK CTA:  1.  No significant stenosis or dissection.    Results were called to Dr. Duckworth at 11/8/2023 12:32 AM CST.   Chest XR,  PA & LAT    Impression    IMPRESSION: Negative chest.   MR Brain w/o & w Contrast    Impression    IMPRESSION:  1.  Small acute infarct in the posterior limb right internal capsule.    2.  Additional tiny foci of diffusion restriction in the left basal ganglia and left periatrial white matter compatible with recent ischemia. This may be slightly older than the right internal capsule infarct.    3.  Age-related, chronic ischemic and chronic hemorrhagic changes are otherwise stable.         Discharge Orders        Reason for your hospital stay    You were admitted with a small stroke     Follow-up and recommended labs and tests     Follow up with primary care provider, Zain Roca, within 7 days for hospital follow- up.  The following labs/tests are recommended: Lipid panel in 4-6 weeks.  Continue to monitor blood sugars and blood pressures with primary care.     Activity    Your activity upon discharge: activity as tolerated     Monitor and record    blood pressure twice daily and record values  for primary care to monitor.  Your blood pressure goal is less than 130/80     Diet    Follow this diet upon discharge: Orders Placed This Encounter      Combination Diet Moderate Consistent Carb (60 g CHO per Meal) Diet; Low Saturated Fat Na <2400mg Diet       Examination   Physical Exam   Temp:  [97.9  F (36.6  C)-98.7  F (37.1  C)] 98.2  F (36.8  C)  Pulse:  [55-76] 58  Resp:  [18-27] 21  BP: (116-160)/(70-97) 116/73  SpO2:  [95 %-97 %] 96 %  Wt Readings from Last 1 Encounters:   11/02/23 62.6 kg (138 lb)         Please see EMR for more detailed significant labs, imaging, consultant notes etc.    IMatilde MD, personally saw the patient today and spent greater than 30 minutes discharging this patient.    Matilde Nuñez MD  [facility]    CC:Zain Roca

## 2023-11-08 NOTE — PHARMACY-CONSULT NOTE
Pharmacy Consult to evaluate for medication related stroke core measures    Fredy Garza, 58 year old male admitted for Left-sided weakness on 11/8/2023.    Thrombolytic was not given because of Time from onset contraindications, Clinical contraindications    VTE Prophylaxis SCDs /PCDs placed on 11/8, as appropriate prior to end of hospital day 2.    Antithrombotic: aspirin started on 11/8, as appropriate by end of hospital day 2. Continue antithrombotic therapy on discharge to meet quality measures, unless contraindicated.    Anticoagulation if history of A-fib/flutter: Patient does not have history of A-fib/flutter - anticoagulation not required for medication related stroke core measures.     LDL Cholesterol Calculated   Date Value Ref Range Status   10/11/2023 179 (H) <=100 mg/dL Final       Patient's home statin, Lipitor (atorvastatin) restarted; continue statin on discharge to meet quality measures, unless contraindicated.     Recommendations:  Daily aspirin 81 mg for secondary stroke prevention    Thank you for the consult.    Rg Martínez RPH 11/8/2023 9:19 AM

## 2023-11-08 NOTE — H&P
[facility]    History and Physical - Hospitalist Service       Date of Admission:  11/8/2023    Assessment & Plan      Fredy Garza is a 58 year old male admitted on 11/8/2023. He came to the ED for evaluation of left-sided weakness, numbness and tingling with associated dizziness that started at 8 PM on 11/7/2023    #Acute CVA  -MRI brain without with contrast showed a small acute infarct in the posterior limb right internal capsule  -No TNK secondary to time from onset contraindication and probable recent post TNK bleed versus chronic hemorrhage/cavernoma  -Discussed with stroke neurologist and recommended to start aspirin 325 mg daily  -Continue PTA atorvastatin  -Echocardiogram bubble study on 10/12/2023 was negative at rest and with Valsalva  -Consider patch monitor at discharge to rule out arrhythmias  -SLP/PT/OT evaluation and treatment  -Formal neurology consult    #Chronic kidney disease stage IIIa  -Stable renal function  -Avoid nephrotoxins  -Gentle intravenous hydration postcontrast    #Essential hypertension  -Continue PTA lisinopril    #Hyperlipidemia  -Continue PTA atorvastatin    #Prediabetes  -Hemoglobin A1c 6.1  -Moderate consistent carb diet  -Outpatient follow-up with primary provider    #Mild proximal ascending aorta aneurysmal dilatation 3.7 cm  -CTA neck showed classic aortic arch anatomy with no significant stenosis at the origin of the great vessels  -Risk factor modifications  -Further evaluation with CTA chest, abdomen and pelvis warranted, however not emergently secondary to hemodynamically stable without chest symptoms        Diet: Combination Diet Moderate Consistent Carb (60 g CHO per Meal) Diet; Low Saturated Fat Na <2400mg Diet    DVT Prophylaxis: Pneumatic Compression Devices  Aquino Catheter: Not present  Lines: None     Cardiac Monitoring: ACTIVE order. Indication: Stroke, acute (48 hours)  Code Status: Full Code          Disposition Plan      Expected  Discharge Date: 11/10/2023                  Floyd Spencer MD  Hospitalist Service  [facility] [place]  Securely message with [employer] (more info)  Text page via [facility] Paging/Directory     ______________________________________________________________________    Chief Complaint   Left-sided numbness and weakness    History is obtained from the patient, electronic health record, emergency department physician, and patient's spouse    History of Present Illness   Fredy Garza is a 58 year old male who was brought to the ED from home for evaluation of left-sided numbness and weakness.  Past medical history of CVA treated with TNK on 10/11/2023.  At that time, brain MRI showed a focus of susceptibility within the left thalamus, increased since 2018, probably reflecting a focus of progressive subacute hemorrhage potentially associated with underlying cavernoma; new punctate focus of susceptibility signal loss within the medial right thalamus which may reflect a new focus of chronic microhemorrhage; generalized brain atrophy and multifocal chronic ischemic changes.  Follow-up CT head showed a small focus of hemorrhage in the left thalamus, new from 10/11/2023.  Additional follow-up CT head showed fairly stable left thalamic hemorrhage.  Given these findings patient was asked to stop taking PTA aspirin.  He had residual left-sided weakness, however able to ambulate without assistive device.  He lives at home with his wife, denies tobacco, alcohol or drugs.  On 11/7/2023, he noted worsening left-sided weakness with associated numbness, tingling and dizziness.  The symptoms drastically worsened around 11 PM and arrived to the ED around midnight.  Patient denies recent falls, head trauma, loss of consciousness, chest pain, shortness of breath or palpitations.      Past Medical History    Past Medical History:   Diagnosis Date    HTN (hypertension)     Hyperlipemia        Past Surgical History   Past Surgical  History:   Procedure Laterality Date    OTHER SURGICAL HISTORY      no previous       Prior to Admission Medications   Prior to Admission Medications   Prescriptions Last Dose Informant Patient Reported? Taking?   atorvastatin (LIPITOR) 80 MG tablet 11/7/2023 at PM Self Yes Yes   Sig: [ATORVASTATIN (LIPITOR) 80 MG TABLET] Take 80 mg by mouth at bedtime.   lisinopriL (PRINIVIL,ZESTRIL) 30 MG tablet 11/7/2023 at PM Self Yes Yes   Sig: Take 30 mg by mouth at bedtime   nitroglycerin (NITROSTAT) 0.4 MG SL tablet  at ON-HAND Self Yes No   Sig: [NITROGLYCERIN (NITROSTAT) 0.4 MG SL TABLET] Place 0.4 mg under the tongue every 5 (five) minutes as needed for chest pain.      Facility-Administered Medications: None           Physical Exam   Vital Signs: Temp: 98.7  F (37.1  C) Temp src: Oral BP: 120/71 Pulse: 55   Resp: 27 SpO2: 95 % O2 Device: None (Room air)    Weight: 0 lbs 0 oz    Constitutional: awake  Respiratory: no increased work of breathing, good air exchange, and clear to auscultation  Cardiovascular: regular rate and rhythm, normal S1 and S2, and no murmur noted  GI: normal bowel sounds  Skin: no bruising or bleeding  Musculoskeletal: no lower extremity pitting edema present  Neurologic: Mental Status Exam:  Level of Alertness:   awake  Motor Exam: Left arm and leg strength 4/5    Medical Decision Making       55 MINUTES SPENT BY ME on the date of service doing chart review, history, exam, documentation & further activities per the note.  MANAGEMENT DISCUSSED with the following over the past 24 hours: Patient and wife       Data     I have personally reviewed the following data over the past 24 hrs:    8.2  \   13.6   / 293     136 103 21.5 (H) /  103 (H)   4.1 25 1.38 (H) \     Trop: <6 BNP: N/A     INR:  1.04 PTT:  34   D-dimer:  N/A Fibrinogen:  N/A       Imaging results reviewed over the past 24 hrs:   Recent Results (from the past 24 hour(s))   CTA Head Neck with Contrast    Narrative    EXAM: CTA HEAD NECK W  CONTRAST  LOCATION: [facility]  DATE: 11/8/2023    INDICATION: Stroke hx, worsening symptoms 8 pm or so.  COMPARISON: CT head 10/14/2023, CTA 10/11/2023.  CONTRAST: Isovue 370 75 mL  TECHNIQUE: Head and neck CT angiogram with IV contrast. Noncontrast head CT followed by axial helical CT images of the head and neck vessels obtained during the arterial phase of intravenous contrast administration. Axial 2D reconstructed images and   multiplanar 3D MIP reconstructed images of the head and neck vessels were performed by the technologist. Dose reduction techniques were used. All stenosis measurements made according to NASCET criteria unless otherwise specified.    FINDINGS:   NONCONTRAST HEAD CT:   INTRACRANIAL CONTENTS: No intracranial hemorrhage, extraaxial collection, or mass effect. No CT evidence of acute transcortical infarct. Mild to moderate presumed chronic small vessel ischemic changes. Mild generalized volume loss. No hydrocephalus.   Multiple chronic lacunar infarcts are again demonstrated in the bilateral thalami, corona radiata and basal ganglia.     VISUALIZED ORBITS/SINUSES/MASTOIDS: No intraorbital abnormality. No paranasal sinus mucosal disease. No middle ear or mastoid effusion.    BONES/SOFT TISSUES: No acute abnormality.    HEAD CTA:  ANTERIOR CIRCULATION: No large vessel occlusion or flow-limiting stenosis. Mild to moderate atheromatous changes in the carotid siphons. Scattered areas of mild irregularity and narrowing elsewhere. Within the limits of CTA, no convincing aneurysm or   high flow vascular malformation. Fetal origin of the right posterior cerebral artery from the anterior circulation.    POSTERIOR CIRCULATION: No large vessel occlusion or flow-limiting stenosis. Scattered areas of mild irregularity and narrowing. Within the limits of CTA, no convincing aneurysm or high flow left malformation. Balanced vertebral arteries supply a normal   basilar artery.     DURAL  VENOUS SINUSES: Expected enhancement of the major dural venous sinuses.    NECK CTA:  RIGHT CAROTID: Mild atheromatous changes. No measurable stenosis or dissection.    LEFT CAROTID: Mild atheromatous changes. No measurable stenosis or dissection.    VERTEBRAL ARTERIES: No focal stenosis or dissection. Balanced vertebral arteries.    AORTIC ARCH: Classic aortic arch anatomy with no significant stenosis at the origin of the great vessels.    NONVASCULAR STRUCTURES: Unremarkable.      Impression    IMPRESSION:   HEAD CT:  1.  No CT evidence of acute intracranial hemorrhage or recent transcortical infarct.  2.  Multiple chronic-appearing infarcts in the thalami, basal ganglia and deep white matter.  3.  Underlying mild to moderate presumed chronic small vessel ischemic changes.    HEAD CTA:   1.  No large vessel occlusion or flow-limiting stenosis.  2.  Evidence of intracranial atherosclerosis.    NECK CTA:  1.  No significant stenosis or dissection.    Results were called to Dr. Duckworth at 11/8/2023 12:32 AM CST.   Chest XR,  PA & LAT    Narrative    EXAM: XR CHEST 2 VIEWS  LOCATION: [facility]  DATE: 11/8/2023    INDICATION: cough  COMPARISON: None.      Impression    IMPRESSION: Negative chest.   MR Brain w/o & w Contrast    Narrative    EXAM: MR BRAIN W/O and W CONTRAST  LOCATION: [facility]  DATE: 11/8/2023    INDICATION: Left arm and leg weakness, sp TNK 10/14/2023.  COMPARISON: CTA head and neck from today and brain MRI from 10/12/2023.  CONTRAST: 6.5 mL Gadavist  TECHNIQUE: Routine multiplanar multisequence head MRI without and with intravenous contrast.    FINDINGS:  INTRACRANIAL CONTENTS: Small acute infarct in the right posterior limb internal capsule measuring 7 mm. Additional small focus of diffusion restriction in the left periatrial white matter 5 to 6 mm. Tiny area of diffusion restriction in the left basal   ganglia. Multifocal chronic hemosiderin in the  basal ganglia, thalami, and right hemipons. Small chronic lacunar infarcts in the periventricular white matter, left basal ganglia and right thalamus unchanged compared to the prior exam. No acute   space-occupying hemorrhage. Normal position of the cerebellar tonsils. No pathologic contrast enhancement.    SELLA: No abnormality accounting for technique.    OSSEOUS STRUCTURES/SOFT TISSUES: Normal marrow signal. The major intracranial vascular flow voids are maintained.     ORBITS: No abnormality accounting for technique.     SINUSES/MASTOIDS: Mucous retention cyst in the right maxillary sinus. No middle ear or mastoid effusion.       Impression    IMPRESSION:  1.  Small acute infarct in the posterior limb right internal capsule.    2.  Additional tiny foci of diffusion restriction in the left basal ganglia and left periatrial white matter compatible with recent ischemia. This may be slightly older than the right internal capsule infarct.    3.  Age-related, chronic ischemic and chronic hemorrhagic changes are otherwise stable.

## 2023-11-08 NOTE — ED PROVIDER NOTES
EMERGENCY DEPARTMENT ENCOUNTER      NAME: Fredy Garza  AGE: 58 year old male  YOB: 1965  MRN: [id number]  EVALUATION DATE & TIME: 11/8/2023 12:04 AM    PCP: Zain Roca    ED PROVIDER: Kirti Duckworth M.D.      Chief Complaint   Patient presents with    Stroke Symptoms       FINAL IMPRESSION:  1. Left-sided weakness        ED COURSE & MEDICAL DECISION MAKING:    Left arm and leg weakness, recent tenecteplase for stroke with similar symptoms on 10/11/2023.  Subjectively and objectively weak on left side however NIH stroke scale equals 0.  Obviously not a candidate for tenecteplase today as he has hemorrhagic conversion after TNK and recent stroke which rules him out.  Denies taking any NSAIDs since hospital discharge.  Cannot walk due to left leg weakness today.  Recrudescence of previous stroke possible, no clear findings to explain this based on urinalysis, chest x-ray, blood work today.  MRI brain with and without contrast ordered after CTA did not show any new findings compared to prior studies.  No rebleed/new bleed on CTA today.  Admit to medicine under observation, spoke to Dr. Spencer at 2:47 AM.          12:01 AM I met with the patient to gather history and to perform my initial exam. I discussed the plan for care while in the Emergency Department. I called a Tier 2 stroke code.  12:08 AM Spoke with Stroke Neurology, Dr. Chew.  12:25 AM Reevaluated patient to obtain additional history.  12:32 AM Spoke with [facility], Dr. Turcios.   12:34 AM Spoke with Stroke Neurology, Dr. Chew.  2:24 AM Reevaluated and updated patient with findings.  2:44 AM Spoke with hospitalist Dr. Spencer who accepts patient for admission.     Pertinent Labs & Imaging studies reviewed. (See chart for details).    Medical Decision Making    History:  Supplemental history from: Documented in chart, if applicable  External Record(s) reviewed: Documented in chart, if applicable.    Work Up:  Chart documentation includes  differential considered and any EKGs or imaging independently interpreted by provider, where specified.  In additional to work up documented, I considered the following work up: Documented in chart, if applicable.    External consultation:  Discussion of management with another provider: Hospitalist, Radiology (regarding imaging), and Other: Stroke Neurology.    Complicating factors:  Care impacted by chronic illness: Cerebrovascular Disease  Care affected by social determinants of health: Access to Medical Care    Disposition considerations: Admit.        At the conclusion of the encounter I discussed the results of all of the tests and the disposition. The questions were answered. The patient or family acknowledged understanding and was agreeable with the care plan.      CRITICAL CARE:  N/A    HPI    Patient information was obtained from: patient    Use of : N/A        Fredy Garza is a 58 year old male who presents left sided numbness and focal weakness.    Patient reports after 8 PM today, he notes persistent left sided numbness, tingling, and focal weakness in the arm and leg that was weaker than his baseline that drastically worsened at 11 PM. He did have a stroke 2 weeks ago with similar symptoms. He otherwise denies associating facial droop or slurred speech. There were no other concerns/complaints at this time.    Per Chart Review: Patient was admitted at [facility] from 10/11-10/14/2023 for left sided weakness. Patient initially presented to the ED with left sided weakness and numbness. He was found to have an acute cerebral ischemia.  Echocardiogram showed no WMA and normal EF. Neurology was consulted. CT showed left thalamus with subacute hemorrhage showed no change. Hold ASA and ensure blood pressure control. Patient was ultimately discharged with follow up with PC and neurology.       REVIEW OF SYSTEMS  All other systems negative unless noted in HPI.    PAST MEDICAL  "HISTORY:  Past Medical History:   Diagnosis Date    HTN (hypertension)     Hyperlipemia        PAST SURGICAL HISTORY:  Past Surgical History:   Procedure Laterality Date    OTHER SURGICAL HISTORY      no previous         CURRENT MEDICATIONS:    No current facility-administered medications for this encounter.     Current Outpatient Medications   Medication    atorvastatin (LIPITOR) 80 MG tablet    lisinopriL (PRINIVIL,ZESTRIL) 30 MG tablet    nitroglycerin (NITROSTAT) 0.4 MG SL tablet         ALLERGIES:  No Known Allergies    FAMILY HISTORY:  Family History   Problem Relation Age of Onset    Stomach Cancer Mother        SOCIAL HISTORY:  Social History     Socioeconomic History    Marital status:    Tobacco Use    Smoking status: Never   Substance and Sexual Activity    Alcohol use: No       VITALS:  Patient Vitals for the past 24 hrs:   BP Temp Temp src Pulse Resp SpO2 Height   11/08/23 0130 130/80 -- -- 61 27 95 % --   11/08/23 0108 133/82 -- -- 65 18 95 % --   11/08/23 0030 (!) 160/90 -- -- 70 24 97 % --   11/08/23 0005 -- -- -- -- -- 97 % --   11/07/23 2359 (!) 159/94 98.7  F (37.1  C) Oral 76 24 -- 1.549 m (5' 1\")       PHYSICAL EXAM    VITAL SIGNS: /80   Pulse 61   Temp 98.7  F (37.1  C) (Oral)   Resp 27   Ht 1.549 m (5' 1\")   SpO2 95%   BMI 26.07 kg/m    Physical Exam  Vitals and nursing note reviewed.   Constitutional:       General: He is not in acute distress.     Appearance: Normal appearance. He is not toxic-appearing.   HENT:      Head: Normocephalic and atraumatic.   Eyes:      General: No scleral icterus.        Right eye: No discharge.         Left eye: No discharge.      Extraocular Movements: Extraocular movements intact.      Pupils: Pupils are equal, round, and reactive to light.      Comments: No gaze preference, no visual field deficit on confrontation bilaterally.   Cardiovascular:      Rate and Rhythm: Normal rate and regular rhythm.   Pulmonary:      Effort: Pulmonary effort " is normal. No respiratory distress.      Breath sounds: Normal breath sounds.   Abdominal:      General: There is no distension.      Palpations: Abdomen is soft.      Tenderness: There is no abdominal tenderness.   Musculoskeletal:         General: No swelling or deformity.      Cervical back: Neck supple. No rigidity.   Skin:     General: Skin is warm and dry.      Capillary Refill: Capillary refill takes less than 2 seconds.      Findings: No bruising or erythema.   Neurological:      Mental Status: He is alert.      Comments: Alert and oriented x3, finger-to-nose intact bilaterally, clear speech without dysarthria or aphasia, no facial droop, 4+ out of 5 strength to the left , flexion and extension at the elbow, left ankle dorsiflexion, plantarflexion, hip flexion, hip extension left side.  Otherwise 5 out of 5 strength to upper and lower extremities left shoulder abduction, adduction, right side upper and lower extremity.  Sensation grossly intact to upper and lower extremities bilaterally.  NIHSS=0 as patient does not have drift to upper extremity or lower extremity on the left side.      Psychiatric:         Mood and Affect: Mood normal.         Behavior: Behavior normal.         LABS  Labs Ordered and Resulted from Time of ED Arrival to Time of ED Departure   BASIC METABOLIC PANEL - Abnormal       Result Value    Sodium 136      Potassium 4.1      Chloride 103      Carbon Dioxide (CO2) 25      Anion Gap 8      Urea Nitrogen 21.5 (*)     Creatinine 1.38 (*)     GFR Estimate 60 (*)     Calcium 9.4      Glucose 103 (*)    INR - Normal    INR 1.04     PARTIAL THROMBOPLASTIN TIME - Normal    aPTT 34     TROPONIN T, HIGH SENSITIVITY - Normal    Troponin T, High Sensitivity <6     INFLUENZA A/B, RSV, & SARS-COV2 PCR - Normal    Influenza A PCR Negative      Influenza B PCR Negative      RSV PCR Negative      SARS CoV2 PCR Negative     ROUTINE UA WITH MICROSCOPIC REFLEX TO CULTURE - Normal    Color Urine  Colorless      Appearance Urine Clear      Glucose Urine Negative      Bilirubin Urine Negative      Ketones Urine Negative      Specific Gravity Urine 1.019      Blood Urine Negative      pH Urine 6.0      Protein Albumin Urine Negative      Urobilinogen Urine <2.0      Nitrite Urine Negative      Leukocyte Esterase Urine Negative      RBC Urine <1      WBC Urine <1     CBC WITH PLATELETS AND DIFFERENTIAL    WBC Count 8.2      RBC Count 4.50      Hemoglobin 13.6      Hematocrit 40.2      MCV 89      MCH 30.2      MCHC 33.8      RDW 11.7      Platelet Count 293      % Neutrophils 56      % Lymphocytes 34      % Monocytes 7      % Eosinophils 2      % Basophils 1      % Immature Granulocytes 0      NRBCs per 100 WBC 0      Absolute Neutrophils 4.6      Absolute Lymphocytes 2.7      Absolute Monocytes 0.5      Absolute Eosinophils 0.2      Absolute Basophils 0.1      Absolute Immature Granulocytes 0.0      Absolute NRBCs 0.0     GLUCOSE MONITOR NURSING POCT         RADIOLOGY  Chest XR,  PA & LAT   Final Result   IMPRESSION: Negative chest.      CTA Head Neck with Contrast   Final Result   IMPRESSION:    HEAD CT:   1.  No CT evidence of acute intracranial hemorrhage or recent transcortical infarct.   2.  Multiple chronic-appearing infarcts in the thalami, basal ganglia and deep white matter.   3.  Underlying mild to moderate presumed chronic small vessel ischemic changes.      HEAD CTA:    1.  No large vessel occlusion or flow-limiting stenosis.   2.  Evidence of intracranial atherosclerosis.      NECK CTA:   1.  No significant stenosis or dissection.      Results were called to Dr. Duckworth at 11/8/2023 12:32 AM CST.      MR Brain w/o & w Contrast    (Results Pending)      I have independently reviewed the above image. See radiology report for detail.      EKG:    EKG obtained at 0027 and shows sinus rhythm rate 71bpm, Qtc 454 ms, T wave inversion in lead III, flat T waves V5 V6, previous EKG 10/11/2023 shows some ST  depression V5 V6 which has resolved. I have independently reviewed and interpreted today's EKG, pending Cardiologist read.       PROCEDURES:  N/A      MEDICATIONS GIVEN IN THE EMERGENCY:  Medications   iopamidol (ISOVUE-370) solution 75 mL (75 mLs Intravenous $Given 11/8/23 0022)   gadobutrol (GADAVIST) injection 6.5 mL (6.5 mLs Intravenous $Given 11/8/23 0203)       NEW PRESCRIPTIONS STARTED AT TODAY'S ER VISIT  New Prescriptions    No medications on file        I, Sol Momin, am serving as a scribe to document services personally performed by Kirti Duckworth MD, based on my observations and the provider's statements to me.  I, Kirti Duckworth MD, attest that Sol Momin is acting in a scribe capacity, has observed my performance of the services and has documented them in accordance with my direction.     Kirti Duckworth MD  Emergency Medicine  [facility] EMERGENCY DEPARTMENT  [address] [place] [address] [id number] [phone]  Dept: [phone]             Kirti Duckworth MD  11/08/23 9548

## 2023-11-08 NOTE — PLAN OF CARE
Problem: Adult Inpatient Plan of Care  Goal: Optimal Comfort and Wellbeing  Outcome: Adequate for Care Transition     Problem: Adult Inpatient Plan of Care  Goal: Readiness for Transition of Care  Outcome: Adequate for Care Transition   Goal Outcome Evaluation:             Pt discharged home via personal vehicle driven by family. Resource RN reviewed discharge instructions. No other concerns noted.    Gordo Bower RN  11/8/2023  4:40 PM

## 2023-11-08 NOTE — PROGRESS NOTES
Pt seen and examined and orders reviewed.  D/w Dr. Ocasio.    58M with HLD, pre-DM, recent admission for R internal capsule hemorrhagic CVA which may have been HTNive bleed vs. Hemorrhagic transformation who presents with L sided numbness and tingling and found to have R internal capsule ischemic CVA.    #CVA - d/w neurology and most consistent with small vessel disease.  Also with intra-cranial athero.  Needs aggressive risk factor management  -asa 81.    -lisinopril.  Checks BP at home and readings are good.  -last LDL 170s in setting of non compliance with statin.  Goal LDL < 70.   Has been compliant since last admission.  Continue lipitor 80.  Add zetia as next step.  If not at goal as outpatient would transition to PCSK9-I  -pre-DM2.  Lifestyle modification and outpatient monitoring.  -stop EtOH  -discussed mediterranean diet and exercise.    #CKD3    Anticipate discharge later today.    Matilde Nuñez MD, [facility]  Internal Medicine Hospitalist  11/8/2023

## 2023-11-08 NOTE — PROGRESS NOTES
"Speech-Language Pathology: Clinical Swallow Evaluation     11/08/23 1200   Appointment Info   Signing Clinician's Name / Credentials (SLP) Olamide Gordon MA, CCC-SLP   General Information   Onset of Illness/Injury or Date of Surgery 11/08/23   Referring Physician Floyd Spencer MD   Pertinent History of Current Problem Per ordering provider \"Fredy Garza is a 58 year old male who was brought to the ED from home for evaluation of left-sided numbness and weakness.  Past medical history of CVA treated with TNK on 10/11/2023.  At that time, brain MRI showed a focus of susceptibility within the left thalamus, increased since 2018, probably reflecting a focus of progressive subacute hemorrhage potentially associated with underlying cavernoma; new punctate focus of susceptibility signal loss within the medial right thalamus which may reflect a new focus of chronic microhemorrhage; generalized brain atrophy and multifocal chronic ischemic changes.  Follow-up CT head showed a small focus of hemorrhage in the left thalamus, new from 10/11/2023.  Additional follow-up CT head showed fairly stable left thalamic hemorrhage.  Given these findings patient was asked to stop taking PTA aspirin.  He had residual left-sided weakness, however able to ambulate without assistive device.  He lives at home with his wife, denies tobacco, alcohol or drugs.  On 11/7/2023, he noted worsening left-sided weakness with associated numbness, tingling and dizziness.  The symptoms drastically worsened around 11 PM and arrived to the ED around midnight.  Patient denies recent falls, head trauma, loss of consciousness, chest pain, shortness of breath or palpitations.\".   General Observations Alert and cooperative. Pt has clear speech and cognition appears intact for setting.   Type of Evaluation   Type of Evaluation Swallow Evaluation   Oral Motor   Oral Musculature generally intact   Mucosal Quality good   Dentition (Oral Motor)   Dentition (Oral " Motor) adequate dentition   Facial Symmetry (Oral Motor)   Facial Symmetry (Oral Motor) WNL   Lip Function (Oral Motor)   Lip Range of Motion (Oral Motor) WNL   Lip Sensitivity (Oral Motor) intact   Lip Strength (Oral Motor) WNL   Tongue Function (Oral Motor)   Tongue Sensitivity (Oral Motor) intact   Tongue Strength (Oral Motor) WNL   Tongue Coordination/Speed (Oral Motor) WNL   Tongue ROM (Oral Motor) WNL   Jaw Function (Oral Motor)   Jaw Function (Oral Motor) WNL   Facial Sensation   Facial Sensation WNL   Cough/Swallow/Gag Reflex (Oral Motor)   Soft Palate/Velum (Oral Motor) WNL   Volitional Throat Clear/Cough (Oral Motor) WNL   Volitional Swallow (Oral Motor) WNL   Vocal Quality/Secretion Management (Oral Motor)   Vocal Quality (Oral Motor) WNL   Secretion Management (Oral Motor) WNL   General Swallowing Observations   Past History of Dysphagia None per EMR or pt report   Current Diet/Method of Nutritional Intake (General Swallowing Observations, NIS) regular diet;thin liquids (level 0)   Swallowing Evaluation Clinical swallow evaluation   Clinical Swallow Evaluation   Clinical Swallow Evaluation Textures Trialed thin liquids;solid foods   Clinical Swallow Eval: Thin Liquid Texture Trial   Mode of Presentation, Thin Liquids cup;straw;self-fed   Volume of Liquid or Food Presented 5oz   Oral Phase of Swallow WFL   Pharyngeal Phase of Swallow intact   Diagnostic Statement No overt s/s aspiration   Clinical Swallow Evaluation: Solid Food Texture Trial   Mode of Presentation spoon   Volume Presented x1 cracker   Oral Phase WFL   Pharyngeal Phase intact   Diagnostic Statement No overt s/s aspiration or stasis   Esophageal Phase of Swallow   Patient reports or presents with symptoms of esophageal dysphagia No   Swallowing Recommendations   Diet Consistency Recommendations regular diet;thin liquids (level 0)   Supervision Level for Intake patient independent   Medication Administration Recommendations, Swallowing (SLP)  Patient preference   Instrumental Assessment Recommendations instrumental evaluation not recommended at this time   Comment, Swallowing Recommendations Pt appears to be at low aspiration risk   Clinical Impression   Criteria for Skilled Therapeutic Interventions Met (SLP Eval) Evaluation only;No problems identified which require skilled intervention   Risks & Benefits of therapy have been explained evaluation/treatment results reviewed;care plan/treatment goals reviewed;participants included;patient;participants voiced agreement with care plan   Clinical Impression Comments Clinical Swallow Evaluation completed. Patient had no s/s aspiration and no signs of dysphagia. Oral motor function was WNL. Mastication was WNL. Hyolaryngeal elevation appears intact. Recommend diet of regular and thin. No further ST is warranted at this time. Contact SLP if any questions or concerns.   SLP Total Evaluation Time   Eval: oral/pharyngeal swallow function, clinical swallow Minutes (23761) 20   SLP Discharge Planning   SLP Plan EVAL only   SLP Discharge Recommendation home   SLP Rationale for DC Rec No SLP needs   SLP Brief overview of current status  Recommend diet of regular and thin. No further ST is warranted at this time. Contact SLP if any questions or concerns.

## 2023-11-08 NOTE — CONSULTS
[facility]      Neurology Stroke Consult    Patient Name: Fredy Garza  : 1965 MRN#: [id number]    STROKE DATA    Stroke Code:  Time called:  23 0:05  Time patient seen:  23 00:08  Onset of symptoms:  23  Last known normal (pt's baseline):  23 1900  Head CT read by Dr Chew at:  2023 0:22  Stroke Code de-escalated at 2023 0:30 after discussion with Dr. TAYLER Cardona due to presence of contraindications for both intravenous and intra-arterial stroke treatments.     TPA treatment:  Not given due to head trauma/stroke within the past 3 months.     National Institutes of Health Stroke Scale (at presentation)  NIHSS done at:  time patient seen      Score    Level of consciousness:  (0)   Alert, keenly responsive     LOC questions:  (0)   Answers both questions correctly    LOC commands:  (0)   Performs both tasks correctly    Best gaze:  (0)   Normal    Visual:  (0)   No visual loss    Facial palsy:  (0)   Normal symmetrical movements    Motor arm (left):  (0)   No drift    Motor arm (right):  (0)   No drift    Motor leg (left):  (0)   No drift    Motor leg (right):  (0)   No drift    Limb ataxia:  (0)   Absent    Sensory:  (0)   Normal- no sensory loss    Best language:  (0)   Normal- no aphasia    Dysarthria:  (0)   Normal    Extinction and inattention:  (0)   No abnormality        NIHSS Total Score:  0        Dysphagia Screen  Time of screenin2023 0:100  Screening results: Per Nursing     ASSESSMENT & RECOMMENDATIONS       Fredy Garza is a 58 year old male admitted on 2023. He came to the ED for evaluation of left-sided weakness, numbness and tingling with associated dizziness that started at 8 PM on 2023.  MRI performed earlier today does show an acute ischemic infarct in the right internal capsule.  This is in the context of a recent small hemorrhage within the basal ganglia roughly 4 weeks ago.  Imaging is also notable for  intracranial atherosclerosis, and evidence of extensive small vessel disease bilaterally.  If it were not for this recent intracranial hemorrhage, I would be recommending loading with Plavix, and maintaining dual antiplatelet therapy for the next 3 months given his stroke with intracranial atherosclerosis.  However, given the recent head bleed we will proceed cautiously with a single antiplatelet agent, aspirin, and recommend close follow-up with the stroke team who can decide the most appropriate timing for starting dual antiplatelet therapy.    Note that his examination is nonfocal, though he does have a sensation of left leg weakness when bearing weight.    I discussed at length with the patient and his wife other secondary stroke prevention including optimal control of blood pressure, lipids, alcohol cessation, the Mediterranean diet, and exercising at least 30 minutes a day.    I discussed this case with our stroke neurology team specifically with a question of whether to start dual antiplatelet therapy.  They are in agreement that continuing aspirin 81 mg daily would be recommended at this time, since it appears stroke mechanism is small vessel disease rather than atherosclerotic.     Impression:   #Acute ischemic stroke, small vessel  #Intracranial atherosclerosis  #Recent intracranial hemorrhage, suspect hypertensive bleed secondary to arteriolosclerosis    Recommendations:  Acute Ischemic Stroke (without tPA) Plan  - Neurochecks every 4 hours  - Permissive HTN; labetalol PRN for SBP > 220 for 24 hours.  Then recommend normotension, blood pressure goal less than 130/80.  - Avoid hypotonic IV fluids  - Daily aspirin 81 mg for secondary stroke prevention  - Statin: Continue atorvastatin 80 mg daily  - MRI Stroke Protocol  - Telemetry, EKG  - Bedside Glucose Monitoring  - A1c, Lipid Panel, Troponin x 3  - PT/OT/SLP  - PM&R  - Stroke Education  - Depression Screen  - Apnea Screen  - Euthermia, Euglycemia    -  Screen for gout- if uric acid is elevated would start allopurinol         HPI  Fredy Garza is a 58 year old male admitted on 11/8/2023. He came to the ED for evaluation of left-sided weakness, numbness and tingling with associated dizziness that started at 8 PM on 11/7/2023.     History is notable for a small left thalamic hemorrhage identified 10/13/2023.  Gathered history from the patient and his wife who described that he does sometimes drink excessive amounts of alcohol, though this was in the context of social gatherings and parties where there is significant cultural pressure for him to drink excessively.  He has gone long periods of without drinking alcohol as well, and has not required chemical dependency treatment for this.  They sometimes check his blood pressure at home and is usually close to normal range, though most recent home blood pressure before admission here was 140/90.    Other than the left leg weakness which he feels when he is walking, he does not describe significant symptoms in his upper extremities, his face, any difficulty swallowing, speaking, or any changes in sensation.    Pertinent Past Medical/Surgical History  Past Medical History:   Diagnosis Date    HTN (hypertension)     Hyperlipemia        Past Surgical History:   Procedure Laterality Date    OTHER SURGICAL HISTORY      no previous       Medications:   Current Facility-Administered Medications   Medication    acetaminophen (TYLENOL) tablet 650 mg    [START ON 11/9/2023] aspirin EC tablet 81 mg    atorvastatin (LIPITOR) tablet 80 mg    labetalol (NORMODYNE/TRANDATE) injection 10-20 mg    Or    hydrALAZINE (APRESOLINE) injection 10-20 mg    lidocaine (LMX4) cream    lidocaine 1 % 0.1-1 mL    lisinopril (ZESTRIL) tablet 30 mg    medication instruction - No oral meds if patient didn't pass dysphagia screen    Medication Instructions - Avoid dextrose in IV solutions.    nitroGLYcerin (NITROSTAT) sublingual tablet 0.4 mg     ondansetron (ZOFRAN ODT) ODT tab 4 mg    Or    ondansetron (ZOFRAN) injection 4 mg    sodium chloride (PF) 0.9% PF flush 3 mL    sodium chloride (PF) 0.9% PF flush 3 mL    sodium chloride 0.9 % infusion     Current Outpatient Medications   Medication Sig    atorvastatin (LIPITOR) 80 MG tablet [ATORVASTATIN (LIPITOR) 80 MG TABLET] Take 80 mg by mouth at bedtime.    lisinopriL (PRINIVIL,ZESTRIL) 30 MG tablet Take 30 mg by mouth at bedtime    nitroglycerin (NITROSTAT) 0.4 MG SL tablet [NITROGLYCERIN (NITROSTAT) 0.4 MG SL TABLET] Place 0.4 mg under the tongue every 5 (five) minutes as needed for chest pain.   .    Allergies: No Known Allergies.    Family History:   Family History   Problem Relation Age of Onset    Stomach Cancer Mother    .    Social History:   Social History     Tobacco Use    Smoking status: Never    Smokeless tobacco: Not on file   Substance Use Topics    Alcohol use: No   .    Tobacco use: Never      PHYSICAL EXAMINATION  Vital Signs:  B/P: 127/77,  T: 98.7,  P: 61,  R: 23    General:  patient lying in bed without any acute distress    HEENT:  normocephalic/atraumatic, no oral lesions, no epistaxis   Cardio:  RRR  Pulmonary:  no respiratory distress  Abdomen:  soft, non-tender, non-distended  Extremities:  no edema  Skin:  intact, warm/dry     Neurologic  Mental Status:  fully alert, attentive and oriented, follows commands, speech clear and fluent  Cranial Nerves:  visual fields intact, PERRL, EOMI with normal smooth pursuit, facial sensation intact and symmetric, facial movements symmetric, hearing not formally tested but intact to conversation, palate elevation symmetric and uvula midline, no dysarthria, shoulder shrug strong bilaterally, tongue protrusion midline  Motor:  no abnormal movements, normal tone throughout, normal muscle bulk, no pronator drift, normal and symmetric rapid finger tapping, strength 5/5 throughout upper and lower extremities  Reflexes:  2+ and symmetric throughout, no  clonus, toes downgoing  Sensory:  intact/symmetric to light touch and pin prick throughout upper and lower extremities  Coordination:  FNF and HS intact without dysmetria  Station/Gait:   Able to walk without ataxia or assistance, though it is slowed, and he has a subjective sensation of left leg weakness.    Labs  Labs and Imaging reviewed and used in developing the plan; pertinent results included.     Lab Results   Component Value Date    GLC 96 11/08/2023     IMPRESSION:  1.  Small acute infarct in the posterior limb right internal capsule.     2.  Additional tiny foci of diffusion restriction in the left basal ganglia and left periatrial white matter compatible with recent ischemia. This may be slightly older than the right internal capsule infarct.     3.  Age-related, chronic ischemic and chronic hemorrhagic changes are otherwise stable.    IMPRESSION:   HEAD CT:  1.  No CT evidence of acute intracranial hemorrhage or recent transcortical infarct.  2.  Multiple chronic-appearing infarcts in the thalami, basal ganglia and deep white matter.  3.  Underlying mild to moderate presumed chronic small vessel ischemic changes.     HEAD CTA:   1.  No large vessel occlusion or flow-limiting stenosis.  2.  Evidence of intracranial atherosclerosis.     NECK CTA:  1.  No significant stenosis or dissection.    Joaquin Ocasio MD     This is a level 4 visit due to patient's presentation of an acute pathology which severely impairs bodily function (stroke leading to limb weakness, second intracranial event within 4 weeks).  I have discussed the case with the patient, his wife, got collateral information from his wife, and discussed the case with his hospitalist.  I have reviewed his CT head from prior, and today, as well as MRI/MRI showing the above findings.  Labs notable for elevated creatinine 1.4, elevated liver enzymes, elevated , elevated triglycerides, normal CBC with normal white count, platelets, and  MCV.

## 2023-11-08 NOTE — ED TRIAGE NOTES
Patient arrives from home with wife.   C/o left side weakness/numbness/tingling and dizziness that started at approximately 2000 tonight.     States he had a stroke two weeks ago as well.     Tier 2 stroke code called in triage per Dr. Barcenas.

## 2023-11-08 NOTE — PROGRESS NOTES
Physical Therapy: Orders received. Chart reviewed and discussed with care team.? Physical Therapy not indicated due to symptoms resolved and per OT evaluation patient is independent with mobility and ambulation.? Defer discharge recommendations to treatment team.? Will complete orders.

## 2023-11-08 NOTE — PROGRESS NOTES
Occupational Therapy Discharge Summary    Reason for therapy discharge:    All goals and outcomes met, no further needs identified.    Progress towards therapy goal(s). See goals on Care Plan in [facility] electronic health record for goal details.  Goals met    Therapy recommendation(s):    No further therapy is recommended.       11/08/23 1500   Appointment Info   Signing Clinician's Name / Credentials (OT) Rachell Knight OTR/L OTD   Living Environment   People in Home spouse;child(amelia), adult   Current Living Arrangements house   Home Accessibility stairs to enter home;stairs within home   Number of Stairs, Main Entrance 2   Stair Railings, Main Entrance railings safe and in good condition   Number of Stairs, Within Home, Primary greater than 10 stairs   Stair Railings, Within Home, Primary railings safe and in good condition   Transportation Anticipated family or friend will provide   Living Environment Comments Bedroom and bathroom on second level   Self-Care   Equipment Currently Used at Home none   Fall history within last six months no   Activity/Exercise/Self-Care Comment Ind with all ADLs/IADLs and mobility, works full time   General Information   Onset of Illness/Injury or Date of Surgery 11/08/23   Referring Physician Matilde Nuñez MD   Patient/Family Therapy Goal Statement (OT) To return home   Additional Occupational Profile Info/Pertinent History of Current Problem 58M with HLD, pre-DM, recent admission for R internal capsule hemorrhagic CVA which may have been HTNive bleed vs. Hemorrhagic transformation who presents with L sided numbness and tingling and found to have R internal capsule ischemic CVA.   Existing Precautions/Restrictions no known precautions/restrictions   Cognitive Status Examination   Orientation Status orientation to person, place and time   Affect/Mental Status (Cognitive) WNL   Follows Commands WNL   Visual Perception   Visual Impairment/Limitations WNL   Impact of Vision Impairment on  Function (Vision) Reports no visual changes   Sensory   Sensory Comments L side numbness and tingling reported initially - has mostly resolved   Range of Motion Comprehensive   General Range of Motion no range of motion deficits identified   Strength Comprehensive (MMT)   General Manual Muscle Testing (MMT) Assessment no strength deficits identified   Comment, General Manual Muscle Testing (MMT) Assessment Reports weakness has resolved, 5/5 MMT for all muscle groups on LUE   Coordination   Upper Extremity Coordination No deficits were identified   Coordination Comments Reporting some mild incoordination with LUE, not noticable with screening   Bed Mobility   Bed Mobility No deficits identified   Transfers   Transfers sit-stand transfer;toilet transfer   Transfer Skill: Bed to Chair/Chair to Bed   Bed-Chair [place] (Transfers) independent   Sit-Stand Transfer   Sit-Stand [place] (Transfers) independent   Toilet Transfer   [place] Level (Toilet Transfer) independent   Balance   Balance Assessment standing dynamic balance   Standing Balance: Dynamic supervision   Activities of Daily Living   BADL Assessment/Intervention lower body dressing;grooming;toileting   Lower Body Dressing Assessment/Training   [place] Level (Lower Body Dressing) doff;don;shoes/slippers;independent   Grooming Assessment/Training   [place] Level (Grooming) independent   Toileting   [place] Level (Toileting) independent   Clinical Impression   Criteria for Skilled Therapeutic Interventions Met (OT) Yes, treatment indicated   OT Diagnosis Stroke education   Influenced by the following impairments Acute ischemic stroke   OT Problem List-Impairments impacting ADL coordination   Assessment of Occupational Performance 1-3 Performance Deficits   Identified Performance Deficits stroke education, health mgmt   Planned Therapy Interventions (OT) home program guidelines;progressive activity/exercise;risk factor education    Clinical Decision Making Complexity (OT) problem focused assessment/low complexity   Risk & Benefits of therapy have been explained evaluation/treatment results reviewed;care plan/treatment goals reviewed;patient;spouse/significant other   OT Total Evaluation Time   OT Eval, Low Complexity Minutes (45070) 12   OT Goals   Therapy Frequency (OT) One time eval and treatment   OT Predicted Duration/Target Date for Goal Attainment 11/08/23   OT Goals OT Goal 1   OT: Goal 1 Pt will demo understanding of LUE exer to increase ease with coordination; Goal met; Completed   Interventions   Interventions Quick Adds Self-Care/Home Management   Self-Care/Home Management   Self-Care/Home Mgmt/ADL, Compensatory, Meal Prep Minutes (13824) 8   Symptoms Noted During/After Treatment (Meal Preparation/Planning Training) none   Treatment Detail/Skilled Intervention Evaluation completed, treatment initiated. Provided education on stroke mgmt and encouragement of fine motor activities to increase ease with coordination for LUE, pt demos understanding. Encouraged to abide by recommendations for med diet, exercise, decreasing ETOH, pt demos understanding and reports motivation to complete   OT Discharge Planning   OT Plan D/c OT   OT Discharge Recommendation (DC Rec) home   OT Rationale for DC Rec Pt near fxl baseline, most stroke symptoms have subsided - good support at home and motivated to change lifestyle   OT Brief overview of current status Ind with fxl mob and ADLs   Total Session Time   Timed Code Treatment Minutes 8   Total Session Time (sum of timed and untimed services) 20

## 2023-11-08 NOTE — MEDICATION SCRIBE - ADMISSION MEDICATION HISTORY
Medication Scribe Admission Medication History    Admission medication history is complete. The information provided in this note is only as accurate as the sources available at the time of the update.    Information Source(s): Patient via in-person    Pertinent Information: Patient says he stopped Aspirin 2 weeks ago after leaving the hospital. Might need a replacement script (fear of expiration). Lisinopril is said to be refilled at [facility] and is taken regularly.     Changes made to PTA medication list:  Added: None  Deleted: None  Changed: Lisinopril to bedtime dose per pt    Medication Affordability:  Not including over the counter (OTC) medications, was there a time in the past 3 months when you did not take your medications as prescribed because of cost?: No    Allergies reviewed with patient and updates made in EHR: yes    Medication History Completed By: Stephanie Wilder 11/8/2023 1:58 AM  Prior to Admission medications    Medication Sig Last Dose Taking? Auth Provider Long Term End Date   atorvastatin (LIPITOR) 80 MG tablet [ATORVASTATIN (LIPITOR) 80 MG TABLET] Take 80 mg by mouth at bedtime. 11/7/2023 at PM Yes Provider, Historical Yes    lisinopriL (PRINIVIL,ZESTRIL) 30 MG tablet [LISINOPRIL (PRINIVIL,ZESTRIL) 30 MG TABLET] Take 30 mg by mouth daily. 11/7/2023 at PM Yes Provider, Historical     nitroglycerin (NITROSTAT) 0.4 MG SL tablet [NITROGLYCERIN (NITROSTAT) 0.4 MG SL TABLET] Place 0.4 mg under the tongue every 5 (five) minutes as needed for chest pain.  at [facility]  Provider, Historical Yes

## 2023-11-08 NOTE — PLAN OF CARE
Problem: Comorbidity Management  Goal: Blood Glucose Levels Within Targeted Range  Outcome: Progressing     Problem: Stroke, Intracerebral Hemorrhage  Goal: Optimal Coping  Outcome: Progressing  Intervention: Support Psychosocial Response to Stroke  Recent Flowsheet Documentation  Taken 11/8/2023 1001 by Amy Gray RN  Supportive Measures: active listening utilized     Problem: Stroke, Intracerebral Hemorrhage  Goal: Optimal Coping  Intervention: Support Psychosocial Response to Stroke  Recent Flowsheet Documentation  Taken 11/8/2023 1001 by Amy Gray RN  Supportive Measures: active listening utilized   Goal Outcome Evaluation:    Patient is A&O x 4, pleasant. Able to make needs known, denies pain, denies numbness and tingling, neuros intact, patient's spouse at bedside. Urinal and call light within reach. PT walked with patient in hallways. OT cleared to update diet. Waiting for discharge orders.

## 2023-11-09 ENCOUNTER — PATIENT OUTREACH (OUTPATIENT)
Dept: CARE COORDINATION | Facility: CLINIC | Age: 58
End: 2023-11-09
Payer: COMMERCIAL

## 2023-11-09 NOTE — PROGRESS NOTES
"Clinic Care Coordination Contact  [facility]: Post-Discharge Note  SITUATION                                                      Admission:    Admission Date: 11/08/23   Reason for Admission: Numbness, Weakness, Stroke symptoms  Discharge:   Discharge Date: 11/08/23  Discharge Diagnosis: Acute ischemic stroke (H), Hyperlipemia, CKD (chronic kidney disease) stage 3, GFR 30-59 ml/min (H), Left-sided weakness, Essential hypertension    BACKGROUND                                                      Per hospital discharge summary and inpatient provider notes:    Fredy Garza is a 58 year old male who presents left sided numbness and focal weakness.     Patient reports after 8 PM today, he notes persistent left sided numbness, tingling, and focal weakness in the arm and leg that was weaker than his baseline that drastically worsened at 11 PM. He did have a stroke 2 weeks ago with similar symptoms. He otherwise denies associating facial droop or slurred speech. There were no other concerns/complaints at this time.     Per Chart Review: Patient was admitted at [facility] from 10/11-10/14/2023 for left sided weakness. Patient initially presented to the ED with left sided weakness and numbness. He was found to have an acute cerebral ischemia.  Echocardiogram showed no WMA and normal EF. Neurology was consulted. CT showed left thalamus with subacute hemorrhage showed no change. Hold ASA and ensure blood pressure control. Patient was ultimately discharged with follow up with PC and neurology.      ASSESSMENT      Discharge Assessment  How are you doing now that you are home?: \"Okay. Yeah I should be okay.\"  How are your symptoms? (Red Flag symptoms escalate to triage hotline per guidelines): Improved  Do you feel your condition is stable enough to be safe at home until your provider visit?: Yes  Does the patient have their discharge instructions? : Yes  Does the patient have questions " regarding their discharge instructions? : No  Were you started on any new medications or were there changes to any of your previous medications? : Yes  Does the patient have all of their medications?: Yes  Do you have questions regarding any of your medications? : No  Do you have all of your needed medical supplies or equipment (DME)?  (i.e. oxygen tank, CPAP, cane, etc.): Yes  Discharge follow-up appointment scheduled within 14 calendar days? : Yes  Discharge Follow Up Appointment Date: 11/14/23  Discharge Follow Up Appointment Scheduled with?: Primary Care Provider    Post-op ([facility] CTA Only)  If the patient had a surgery or procedure, do they have any questions for a nurse?: No    PLAN                                                      Outpatient Plan:      Follow-up and recommended labs and tests  Follow up with primary care provider, Zain Roca, within 7 days for  hospital follow- up. The following labs/tests are recommended: Lipid  panel in 4-6 weeks. Continue to monitor blood sugars and blood  pressures with primary care.    Future tests that were ordered for you:   Stroke Hospital Follow Up    Please be aware that coverage of these services is subject to the terms  and limitations of your health insurance plan. Call member services at  your health plan with any benefit or coverage questions.  [email or web address] will call you to coordinate care as prescribed by your  provider. If you don t hear from a representative within 2 business days,  please call [phone].      Future Appointments   Date Time Provider Department Center   5/10/2024  2:30 PM [facility]         For any urgent concerns, please contact our 24 hour nurse triage line: [phone] ([phone])         BRODY Kenney  [phone]  Connected Care Resource [facility]

## 2023-11-20 LAB
ATRIAL RATE - MUSE: 71 BPM
DIASTOLIC BLOOD PRESSURE - MUSE: NORMAL MMHG
INTERPRETATION ECG - MUSE: NORMAL
P AXIS - MUSE: 64 DEGREES
PR INTERVAL - MUSE: 200 MS
QRS DURATION - MUSE: 94 MS
QT - MUSE: 418 MS
QTC - MUSE: 454 MS
R AXIS - MUSE: -10 DEGREES
SYSTOLIC BLOOD PRESSURE - MUSE: NORMAL MMHG
T AXIS - MUSE: 14 DEGREES
VENTRICULAR RATE- MUSE: 71 BPM

## 2023-12-12 ENCOUNTER — TELEPHONE (OUTPATIENT)
Dept: ENDOCRINOLOGY | Facility: CLINIC | Age: 58
End: 2023-12-12

## 2023-12-12 NOTE — TELEPHONE ENCOUNTER
----- Message from Janet Guerrero CNP sent at 12/12/2023  1:28 PM CST -----  Umberto Cortés,     This patient was a no-show for his appointment today. Can we get him on a recall list to reschedule? He has had two strokes in two months and really needs to be seen for additional testing.     Thanks,   Janet

## 2024-01-18 NOTE — PROGRESS NOTES
__________________________________________________________      [facility] M[facility]wood   [phone]  __________________________________________________________    Chief Complaint: Patient presents with:  Stroke: Stroke      History of Present Illness: Fredy Garza is a 58 year old right handed male presenting for right internal capsule and right basal ganglia strokes. This is a follow-up to a hospitalization that recently happened at [facility] on 11/8/23.  Prior to that, he was hospitalized at [facility] from 10- to 10- after presenting with left hemiparesis and numbness and given TNK. Follow-up MRI did not show any acute infarct but was significant for a left thalamic subacute hemorrhage.    Prior to the hospital stay, he had a past medical history of hypertension, hyperlipidemia, TIA/CVA (2018), CKD.    He presented to the hospital with left hemiparesis and numbness/tingling and dizziness, similar to his previous presentation. Due to having recently received TNK, and recent subacute thalamic hemorrhage he was not a candidate for thrombolysis. He was found to have an acute right internal capsule infarct.      He was started on aspirin monotherapy for recurrent stroke prevention.  Dual antiplatelet therapy was considered but ultimately not prescribed due to his recent bleed and the fact that the stroke mechanism is likely small vessel disease rather than atherosclerosis.  He has been doing well since his hospitalization.  He does not have any residual deficits and reports that he is back to his baseline.    Stroke Evaluation Summarized:    (Studies personally re-reviewed by me today or new results resulted and reviewed by me today are in BOLD below)    MRI/Head CT MRI 11/8: small acute right internal capsule infarct, tiny acute/subacute left basal ganglia infarcts, multifocal chronic hemosiderin in the basal ganglia, thalami, and right lucio campbell, small  scattered chronic lacunar infarcts   Intracranial Vasculature No LVO, mild to moderate carotid siphon plaque, fetal right PCA, scattered atherosclerotic disease   Cervical Vasculature No LVO, no high-grade stenosis, no dissection   I personally reviewed the following neuroimaging studies today and the comments above reflect my own personal interpretation of the images: images: CT head, CTA head/neck, MRI brain, and I also showed MRI to the patient myself today.    Echocardiogram EF 60-65%, moderate concentric LVH, no WMA, negative bubble study, normal atrial sizes    EKG/Telemetry SR    Other Testing Not Applicable      Labs Lab Results   Component Value Date    LDL 84 11/08/2023    A1C 6.1 (H) 10/11/2023    CTROPT <6 11/08/2023    INR 1.04 11/08/2023    INR 0.92 10/12/2023        Impression/Plan:     1.  Recent right internal capsule and left periatrial white matter infarcts, likely due to small vessel disease  2.  Multifocal chronic microhemorrhages, likely hypertensive, in the basal ganglia, thalami, and right hemipons  3.  Scattered chronic lacunar infarcts (periventricular white matter, left basal ganglia, right thalamus)  4.  Intracranial atherosclerosis  -Aspirin 81 mg daily indefinitely for secondary stroke prevention  -Atorvastatin 80 mg daily; LDL goal 40-70  -Blood pressure goal 130/80; provided Rx for BP machine today   -A1c is 6.1, below goal of 7.0, continue current management  -Sleep clinic referral for positive sleep apnea screen  -Follow-up in stroke clinic in 3 months    Stroke Education provided.  He will call us with any questions.  For any acute neurologic deficits he was advised to  go directly to the hospital rather than call the clinic.    Kirti Mclaughlin, JEFFERSON  Neurology  01/23/2024    ___________________________________________________________________    Current Medications  Current Outpatient Medications   Medication Sig    aspirin 81 MG EC tablet Take 1 tablet (81 mg) by mouth daily     "atorvastatin (LIPITOR) 80 MG tablet Take 1 tablet (80 mg) by mouth at bedtime    ezetimibe (ZETIA) 10 MG tablet Take 1 tablet (10 mg) by mouth at bedtime    lisinopril (ZESTRIL) 30 MG tablet Take 1 tablet (30 mg) by mouth at bedtime    nitroglycerin (NITROSTAT) 0.4 MG SL tablet [NITROGLYCERIN (NITROSTAT) 0.4 MG SL TABLET] Place 0.4 mg under the tongue every 5 (five) minutes as needed for chest pain.     No current facility-administered medications for this visit.       Physical Exam    Estimated body mass index is 25.24 kg/m  as calculated from the following:    Height as of 11/7/23: 1.549 m (5' 1\").    Weight as of this encounter: 60.6 kg (133 lb 9.6 oz).    BP (!) 146/94   Pulse 54   Wt 60.6 kg (133 lb 9.6 oz)   SpO2 99%   BMI 25.24 kg/m         General Exam  General: Sitting up in chair in no acute distress  Cardio:  RRR  Pulmonary:  no respiratory distress    Neurologic:  Mental Status:  alert, oriented x 3, follows commands, speech clear and fluent, naming and repetition normal  Cranial Nerves:  visual fields intact, PERRL, EOMI with normal smooth pursuit, facial sensation intact and symmetric, facial movements symmetric, hearing not formally tested but intact to conversation, no dysarthria, shoulder shrug strong bilaterally, tongue protrusion midline  Motor:  normal muscle tone and bulk, no abnormal movements, able to move all limbs spontaneously, strength 5/5 throughout upper and lower extremities, no pronator drift  Sensory:  light touch sensation intact and symmetric throughout upper and lower extremities, no extinction on double simultaneous stimulation   Coordination:  normal finger-to-nose and heel-to-shin bilaterally without dysmetria  Station/Gait:  deferred    Modified Edwin Scale  Score: 0-No symptoms    Questionnaires:        10/19/2023     9:29 AM 1/23/2024     7:37 AM   Stroke Questionnaire   Residual effects: Any residual effects from stroke? Yes, I do    Level of independence: Could you " live alone?  Yes   Level of independence: Walking Independent    Level of independence: Eating Independent    Level of independence: Stairs Independent    Level of independence: Dressing Independent    Level of independence: Bathing Independent    Level of independence: Toileting Independent    Current therapy: Physical Therapy No    Current therapy: Occupation Therapy No    Current therapy: Speech Therapy No    Current therapy: Other No    Current services: Home Health No, not needed    Quality of Life: What work now or before stroke  Employed full time   Quality of Life: Living situation before stroke With family or significant other    Quality of Life: Living situation now With family or significant other    Medication: How do you take your meds Myself, from a pillbox that I set up Myself, from individual bottles   Medication: Do you ever miss or forget meds Rarely    Risk Factor: Checking blood pressure at home No, I have a blood pressure cuff but do not use it Yes   Risk Factor: Usual blood pressure numbers  didnt write down   Risk Factor: Why not checking BP at home Hard for me to remember to check it    Risk Factor: Checking blood sugar at home No No   Risk Factor: Second-hand smoke at home No No   Risk Factor: How much caffeine per day  coffee and tea once a day   Who completed this questionnaire?  The patient independently           Billing:    I spent a total of 30 minutes on the day of the visit.   Time spent by me doing chart review, history and exam, documentation and further activities per the note

## 2024-01-22 ENCOUNTER — TELEPHONE (OUTPATIENT)
Dept: NEUROLOGY | Facility: CLINIC | Age: 59
End: 2024-01-22
Payer: COMMERCIAL

## 2024-01-23 ENCOUNTER — OFFICE VISIT (OUTPATIENT)
Dept: NEUROLOGY | Facility: CLINIC | Age: 59
End: 2024-01-23
Attending: HOSPITALIST
Payer: COMMERCIAL

## 2024-01-23 VITALS
WEIGHT: 133.6 LBS | HEART RATE: 54 BPM | DIASTOLIC BLOOD PRESSURE: 94 MMHG | BODY MASS INDEX: 25.24 KG/M2 | OXYGEN SATURATION: 99 % | SYSTOLIC BLOOD PRESSURE: 146 MMHG

## 2024-01-23 DIAGNOSIS — G45.9 TIA (TRANSIENT ISCHEMIC ATTACK): ICD-10-CM

## 2024-01-23 DIAGNOSIS — I63.9 ACUTE ISCHEMIC STROKE (H): Primary | ICD-10-CM

## 2024-01-23 DIAGNOSIS — R53.1 LEFT-SIDED WEAKNESS: ICD-10-CM

## 2024-01-23 DIAGNOSIS — I10 ESSENTIAL HYPERTENSION: ICD-10-CM

## 2024-01-23 PROCEDURE — 99214 OFFICE O/P EST MOD 30 MIN: CPT | Performed by: NURSE PRACTITIONER

## 2024-01-23 RX ORDER — ATORVASTATIN CALCIUM 80 MG/1
80 TABLET, FILM COATED ORAL AT BEDTIME
Qty: 90 TABLET | Refills: 3 | Status: SHIPPED | OUTPATIENT
Start: 2024-01-23

## 2024-01-23 RX ORDER — LISINOPRIL 30 MG/1
30 TABLET ORAL AT BEDTIME
Qty: 90 TABLET | Refills: 3 | Status: SHIPPED | OUTPATIENT
Start: 2024-01-23

## 2024-01-23 NOTE — LETTER
1/23/2024         RE: Fredy Garza  [address] [address]        Dear Colleague,    Thank you for referring your patient, Fredy Garza, to the [facility] NEUROLOGY [facility]. Please see a copy of my visit note below.    __________________________________________________________      [facility] Neurology [facility]   [phone]  __________________________________________________________    Chief Complaint: Patient presents with:  Stroke: Stroke      History of Present Illness: Fredy Garza is a 58 year old right handed male presenting for right internal capsule and right basal ganglia strokes. This is a follow-up to a hospitalization that recently happened at [facility] on 11/8/23.  Prior to that, he was hospitalized at [facility] from 10- to 10- after presenting with left hemiparesis and numbness and given TNK. Follow-up MRI did not show any acute infarct but was significant for a left thalamic subacute hemorrhage.    Prior to the hospital stay, he had a past medical history of hypertension, hyperlipidemia, TIA/CVA (2018), CKD.    He presented to the hospital with left hemiparesis and numbness/tingling and dizziness, similar to his previous presentation. Due to having recently received TNK, and recent subacute thalamic hemorrhage he was not a candidate for thrombolysis. He was found to have an acute right internal capsule infarct.      He was started on aspirin monotherapy for recurrent stroke prevention.  Dual antiplatelet therapy was considered but ultimately not prescribed due to his recent bleed and the fact that the stroke mechanism is likely small vessel disease rather than atherosclerosis.  He has been doing well since his hospitalization.  He does not have any residual deficits and reports that he is back to his baseline.    Stroke Evaluation Summarized:    (Studies personally re-reviewed by me today or new results resulted and  reviewed by me today are in BOLD below)    MRI/Head CT MRI 11/8: small acute right internal capsule infarct, tiny acute/subacute left basal ganglia infarcts, multifocal chronic hemosiderin in the basal ganglia, thalami, and right lucio campbell, small scattered chronic lacunar infarcts   Intracranial Vasculature No LVO, mild to moderate carotid siphon plaque, fetal right PCA, scattered atherosclerotic disease   Cervical Vasculature No LVO, no high-grade stenosis, no dissection   I personally reviewed the following neuroimaging studies today and the comments above reflect my own personal interpretation of the images: images: CT head, CTA head/neck, MRI brain, and I also showed MRI to the patient myself today.    Echocardiogram EF 60-65%, moderate concentric LVH, no WMA, negative bubble study, normal atrial sizes    EKG/Telemetry SR    Other Testing Not Applicable      Labs Lab Results   Component Value Date    LDL 84 11/08/2023    A1C 6.1 (H) 10/11/2023    CTROPT <6 11/08/2023    INR 1.04 11/08/2023    INR 0.92 10/12/2023        Impression/Plan:     1.  Recent right internal capsule and left periatrial white matter infarcts, likely due to small vessel disease  2.  Multifocal chronic microhemorrhages, likely hypertensive, in the basal ganglia, thalami, and right hemipons  3.  Scattered chronic lacunar infarcts (periventricular white matter, left basal ganglia, right thalamus)  4.  Intracranial atherosclerosis  -Aspirin 81 mg daily indefinitely for secondary stroke prevention  -Atorvastatin 80 mg daily; LDL goal 40-70  -Blood pressure goal 130/80; provided Rx for BP machine today   -A1c is 6.1, below goal of 7.0, continue current management  -Sleep clinic referral for positive sleep apnea screen  -Follow-up in stroke clinic in 3 months    Stroke Education provided.  He will call us with any questions.  For any acute neurologic deficits he was advised to  go directly to the hospital rather than call the clinic.    Kirti PIERCE  "AngmilenaJEFFERSON  Neurology  01/23/2024    ___________________________________________________________________    Current Medications  Current Outpatient Medications   Medication Sig     aspirin 81 MG EC tablet Take 1 tablet (81 mg) by mouth daily     atorvastatin (LIPITOR) 80 MG tablet Take 1 tablet (80 mg) by mouth at bedtime     ezetimibe (ZETIA) 10 MG tablet Take 1 tablet (10 mg) by mouth at bedtime     lisinopril (ZESTRIL) 30 MG tablet Take 1 tablet (30 mg) by mouth at bedtime     nitroglycerin (NITROSTAT) 0.4 MG SL tablet [NITROGLYCERIN (NITROSTAT) 0.4 MG SL TABLET] Place 0.4 mg under the tongue every 5 (five) minutes as needed for chest pain.     No current facility-administered medications for this visit.       Physical Exam    Estimated body mass index is 25.24 kg/m  as calculated from the following:    Height as of 11/7/23: 1.549 m (5' 1\").    Weight as of this encounter: 60.6 kg (133 lb 9.6 oz).    BP (!) 146/94   Pulse 54   Wt 60.6 kg (133 lb 9.6 oz)   SpO2 99%   BMI 25.24 kg/m         General Exam  General: Sitting up in chair in no acute distress  Cardio:  RRR  Pulmonary:  no respiratory distress    Neurologic:  Mental Status:  alert, oriented x 3, follows commands, speech clear and fluent, naming and repetition normal  Cranial Nerves:  visual fields intact, PERRL, EOMI with normal smooth pursuit, facial sensation intact and symmetric, facial movements symmetric, hearing not formally tested but intact to conversation, no dysarthria, shoulder shrug strong bilaterally, tongue protrusion midline  Motor:  normal muscle tone and bulk, no abnormal movements, able to move all limbs spontaneously, strength 5/5 throughout upper and lower extremities, no pronator drift  Sensory:  light touch sensation intact and symmetric throughout upper and lower extremities, no extinction on double simultaneous stimulation   Coordination:  normal finger-to-nose and heel-to-shin bilaterally without dysmetria  Station/Gait:  " deferred    Modified Edwin Scale  Score: 0-No symptoms    Questionnaires:        10/19/2023     9:29 AM 1/23/2024     7:37 AM   Stroke Questionnaire   Residual effects: Any residual effects from stroke? Yes, I do    Level of independence: Could you live alone?  Yes   Level of independence: Walking Independent    Level of independence: Eating Independent    Level of independence: Stairs Independent    Level of independence: Dressing Independent    Level of independence: Bathing Independent    Level of independence: Toileting Independent    Current therapy: Physical Therapy No    Current therapy: Occupation Therapy No    Current therapy: Speech Therapy No    Current therapy: Other No    Current services: Home Health No, not needed    Quality of Life: What work now or before stroke  Employed full time   Quality of Life: Living situation before stroke With family or significant other    Quality of Life: Living situation now With family or significant other    Medication: How do you take your meds Myself, from a pillbox that I set up Myself, from individual bottles   Medication: Do you ever miss or forget meds Rarely    Risk Factor: Checking blood pressure at home No, I have a blood pressure cuff but do not use it Yes   Risk Factor: Usual blood pressure numbers  didnt write down   Risk Factor: Why not checking BP at home Hard for me to remember to check it    Risk Factor: Checking blood sugar at home No No   Risk Factor: Second-hand smoke at home No No   Risk Factor: How much caffeine per day  coffee and tea once a day   Who completed this questionnaire?  The patient independently           Billing:    I spent a total of 30 minutes on the day of the visit.   Time spent by me doing chart review, history and exam, documentation and further activities per the note          Again, thank you for allowing me to participate in the care of your patient.        Sincerely,        Kirti Mclaughlin NP

## 2024-01-23 NOTE — PATIENT INSTRUCTIONS
- Recent right internal capsule and left periatrial white matter infarcts, likely due to small vessel disease  - Multifocal chronic microhemorrhages, likely hypertensive, in the basal ganglia, thalami, and right hemipons  - Scattered chronic lacunar infarcts (periventricular white matter, left basal ganglia, right thalamus)  -Aspirin 81 mg daily indefinitely for secondary stroke prevention  -Atorvastatin 80 mg daily; LDL goal 40-70; refills ordered   -Blood pressure goal 130/80; provided Rx for BP machine today and refills for lisinopril sent to pharmacy   -A1c is 6.1, below goal of 7.0, continue current management  -Sleep clinic referral for positive sleep apnea screen; if you don't hear from them early next week, call [phone] to schedule   -Follow-up in stroke clinic in 3 months

## 2024-03-09 ENCOUNTER — HEALTH MAINTENANCE LETTER (OUTPATIENT)
Age: 59
End: 2024-03-09

## 2024-03-21 ENCOUNTER — TELEPHONE (OUTPATIENT)
Dept: NEUROLOGY | Facility: CLINIC | Age: 59
End: 2024-03-21
Payer: COMMERCIAL

## 2024-03-22 NOTE — PROGRESS NOTES
"Virtual Visit Details    Type of service:  Video Visit   0813-0825  Originating Location (pt. Location): Home    Distant Location (provider location):  Off-site  Platform used for Video Visit: Rocky   __________________________________________________________       [facility] [place]              [phone]  __________________________________________________________             History of Present Illness   Chief Complaint: No chief complaint on file.        Fredy Garza is a 59 year old male presenting for follow-up for right internal capsule and right basal ganglia ischemic strokes.      He was hospitalized at [facility] on 11/8/23. Prior to that, he was hospitalized at [facility] from 10- to 10- after presenting with left hemiparesis and numbness and given TNK. Follow-up MRI did not show any acute infarct but was significant for a left thalamic subacute hemorrhage. Prior to the hospital stay, he had a past medical history of hypertension, hyperlipidemia, TIA/CVA (2018), CKD.     He presented to the hospital with left hemiparesis, numbness/tingling, and dizziness, similar to his previous presentation. Due to having recently received TNK, and recent subacute thalamic hemorrhage he was not a candidate for thrombolysis. He was found to have an acute right internal capsule infarct.       He was started on aspirin monotherapy for recurrent stroke prevention.  Dual antiplatelet therapy was considered but ultimately not prescribed due to his recent bleed and the fact that the stroke mechanism is likely small vessel disease rather than atherosclerosis. He does not have any residual deficits and reports that he is back to his baseline.     He reports that last night, he had some cardiac symptoms that he had difficulty describing. He denies any chest pain or pressure, no nausea/vomiting. He can just \"feel it\".      Modified Miami Scale  Score: 0-No symptoms   "   Stroke Evaluation Summarized:  New results resulted and reviewed by me today are in BOLD below.  I personally reviewed the following neuroimaging studies today and the comments above reflect my own personal interpretation of the images: images: CT head, CTA head/neck, and MRI brain     MRI/Head CT MRI 11/8: small acute right internal capsule infarct, tiny acute/subacute left basal ganglia infarcts, multifocal chronic hemosiderin in the basal ganglia, thalami, and right lucio campbell, small scattered chronic lacunar infarcts    Intracranial Vasculature No LVO, mild to moderate carotid siphon plaque, fetal right PCA, scattered atherosclerotic disease    Cervical Vasculature No LVO, no high-grade stenosis, no dissection       Echocardiogram EF 60-65%, moderate concentric LVH, no WMA, negative bubble study, normal atrial sizes     EKG/Telemetry SR    Other Testing Not Applicable       Labs       Lab Results   Component Value Date     LDL 84 11/08/2023     A1C 6.1 (H) 10/11/2023     CTROPT <6 11/08/2023     INR 1.04 11/08/2023     INR 0.92 10/12/2023                    Home Medications      No outpatient medications have been marked as taking for the 3/22/24 encounter (Appointment) with Kirti Mclaughlin NP.              Physical Examination      Vitals:               BMI Readings from Last 1 Encounters:   01/23/24 25.24 kg/m          Neurologic: Completed via telemedicine video call  Mental Status:  alert, oriented, attentive, speech clear and fluent  Cranial Nerves:  facial movements symmetric, hearing not formally tested but intact to conversation, no dysarthria  Motor:  no abnormal movements, able to move all limbs antigravity spontaneously with no signs of hemiparesis observed           Screenings and Questionnaires:      Tobacco:      Tobacco Use         Smoking status: Never      Smokeless tobacco: None         Sleep Apnea:     Depression:       1/23/2024     7:31 AM 11/2/2023     9:05 AM   PHQ-2 ( 1999 [employer])   Q1:  Little interest or pleasure in doing things 0 0   Q2: Feeling down, depressed or hopeless 0 0   PHQ-2 Score 0 0   Q1: Little interest or pleasure in doing things Not at all     Q2: Feeling down, depressed or hopeless Not at all     PHQ-2 Score 0           Stroke Recovery and Risk Factors:       10/19/2023     9:29 AM 1/23/2024     7:37 AM   Stroke Questionnaire   Residual effects: Any residual effects from stroke? Yes, I do     Level of independence: Could you live alone?   Yes   Level of independence: Walking Independent     Level of independence: Eating Independent     Level of independence: Stairs Independent     Level of independence: Dressing Independent     Level of independence: Bathing Independent     Level of independence: Toileting Independent     Current therapy: Physical Therapy No     Current therapy: Occupation Therapy No     Current therapy: Speech Therapy No     Current therapy: Other No     Current services: Home Health No, not needed     Quality of Life: What work now or before stroke   Employed full time   Quality of Life: Living situation before stroke With family or significant other     Quality of Life: Living situation now With family or significant other     Medication: How do you take your meds Myself, from a pillbox that I set up Myself, from individual bottles   Medication: Do you ever miss or forget meds Rarely     Risk Factor: Checking blood pressure at home No, I have a blood pressure cuff but do not use it Yes   Risk Factor: Usual blood pressure numbers   didnt write down   Risk Factor: Why not checking BP at home Hard for me to remember to check it     Risk Factor: Checking blood sugar at home No No   Risk Factor: Second-hand smoke at home No No   Risk Factor: How much caffeine per day   coffee and tea once a day   Who completed this questionnaire?   The patient independently             Assessment and Plan         1. Right internal capsule and left periatrial white matter infarcts,  likely due to small vessel disease   2. Multifocal chronic microhemorrhages, likely hypertensive, in the basal ganglia, thalami, and right hemipons   3. Scattered chronic lacunar infarcts (periventricular white matter, left basal ganglia, right thalamus)   4. Intracranial atherosclerosis   - aspirin 81 mg daily indefinitely for secondary stroke prevention   - atorvastatin 80 mg and ezetimibe 10 mg daily; LDL goal 40-70  - blood pressure goal 130/80  - previously referred to sleep clinic for positive sleep apnea screen; pt was provided with scheduling number   - follow up in stroke clinic in 6 months      Stroke Education provided.  He will call us with any questions.  For any acute neurologic deficits he was advised to  go directly to the hospital rather than call the clinic.        Kirti Mclaughlin NP  Neurology  03/12/2024            ____________________________________________________________________     Billing:     I spent a total of 30 minutes on the day of the visit.      Time spent by me doing chart review, history and exam, documentation and further activities per the note

## 2024-03-26 ENCOUNTER — VIRTUAL VISIT (OUTPATIENT)
Dept: NEUROLOGY | Facility: CLINIC | Age: 59
End: 2024-03-26
Payer: COMMERCIAL

## 2024-03-26 VITALS
HEIGHT: 61 IN | BODY MASS INDEX: 26.43 KG/M2 | SYSTOLIC BLOOD PRESSURE: 150 MMHG | WEIGHT: 140 LBS | DIASTOLIC BLOOD PRESSURE: 90 MMHG

## 2024-03-26 DIAGNOSIS — R00.2 PALPITATIONS: ICD-10-CM

## 2024-03-26 DIAGNOSIS — G45.9 TIA (TRANSIENT ISCHEMIC ATTACK): Primary | ICD-10-CM

## 2024-03-26 PROCEDURE — 99214 OFFICE O/P EST MOD 30 MIN: CPT | Mod: 95 | Performed by: NURSE PRACTITIONER

## 2024-03-26 ASSESSMENT — PAIN SCALES - GENERAL: PAINLEVEL: MILD PAIN (2)

## 2024-03-26 NOTE — LETTER
3/26/2024         RE: Fredy Garza  [address] [address]        Dear Colleague,    Thank you for referring your patient, Fredy Garza, to the [facility]. Please see a copy of my visit note below.    Virtual Visit Details    Type of service:  Video Visit   0813-0825  Originating Location (pt. Location): Home    Distant Location (provider location):  Off-site  Platform used for Video Visit: Si[employer]   __________________________________________________________       [facility]              [phone]  __________________________________________________________             History of Present Illness   Chief Complaint: No chief complaint on file.        Fredy Garza is a 59 year old male presenting for follow-up for right internal capsule and right basal ganglia ischemic strokes.      He was hospitalized at [facility] on 11/8/23. Prior to that, he was hospitalized at [facility] from 10- to 10- after presenting with left hemiparesis and numbness and given TNK. Follow-up MRI did not show any acute infarct but was significant for a left thalamic subacute hemorrhage. Prior to the hospital stay, he had a past medical history of hypertension, hyperlipidemia, TIA/CVA (2018), CKD.     He presented to the hospital with left hemiparesis, numbness/tingling, and dizziness, similar to his previous presentation. Due to having recently received TNK, and recent subacute thalamic hemorrhage he was not a candidate for thrombolysis. He was found to have an acute right internal capsule infarct.       He was started on aspirin monotherapy for recurrent stroke prevention.  Dual antiplatelet therapy was considered but ultimately not prescribed due to his recent bleed and the fact that the stroke mechanism is likely small vessel disease rather than atherosclerosis. He does not have any residual deficits and reports that he is  "back to his baseline.     He reports that last night, he had some cardiac symptoms that he had difficulty describing. He denies any chest pain or pressure, no nausea/vomiting. He can just \"feel it\".      Modified Edwin Scale  Score: 0-No symptoms     Stroke Evaluation Summarized:  New results resulted and reviewed by me today are in BOLD below.  I personally reviewed the following neuroimaging studies today and the comments above reflect my own personal interpretation of the images: images: CT head, CTA head/neck, and MRI brain     MRI/Head CT MRI 11/8: small acute right internal capsule infarct, tiny acute/subacute left basal ganglia infarcts, multifocal chronic hemosiderin in the basal ganglia, thalami, and right lucio campbell, small scattered chronic lacunar infarcts    Intracranial Vasculature No LVO, mild to moderate carotid siphon plaque, fetal right PCA, scattered atherosclerotic disease    Cervical Vasculature No LVO, no high-grade stenosis, no dissection       Echocardiogram EF 60-65%, moderate concentric LVH, no WMA, negative bubble study, normal atrial sizes     EKG/Telemetry SR    Other Testing Not Applicable       Labs       Lab Results   Component Value Date     LDL 84 11/08/2023     A1C 6.1 (H) 10/11/2023     CTROPT <6 11/08/2023     INR 1.04 11/08/2023     INR 0.92 10/12/2023                    Home Medications      No outpatient medications have been marked as taking for the 3/22/24 encounter (Appointment) with Kirti Mclaughlin NP.              Physical Examination      Vitals:               BMI Readings from Last 1 Encounters:   01/23/24 25.24 kg/m          Neurologic: Completed via telemedicine video call  Mental Status:  alert, oriented, attentive, speech clear and fluent  Cranial Nerves:  facial movements symmetric, hearing not formally tested but intact to conversation, no dysarthria  Motor:  no abnormal movements, able to move all limbs antigravity spontaneously with no signs of hemiparesis " observed           Screenings and Questionnaires:      Tobacco:      Tobacco Use         Smoking status: Never      Smokeless tobacco: None         Sleep Apnea:     Depression:       1/23/2024     7:31 AM 11/2/2023     9:05 AM   PHQ-2 ( 1999 [employer])   Q1: Little interest or pleasure in doing things 0 0   Q2: Feeling down, depressed or hopeless 0 0   PHQ-2 Score 0 0   Q1: Little interest or pleasure in doing things Not at all     Q2: Feeling down, depressed or hopeless Not at all     PHQ-2 Score 0           Stroke Recovery and Risk Factors:       10/19/2023     9:29 AM 1/23/2024     7:37 AM   Stroke Questionnaire   Residual effects: Any residual effects from stroke? Yes, I do     Level of independence: Could you live alone?   Yes   Level of independence: Walking Independent     Level of independence: Eating Independent     Level of independence: Stairs Independent     Level of independence: Dressing Independent     Level of independence: Bathing Independent     Level of independence: Toileting Independent     Current therapy: Physical Therapy No     Current therapy: Occupation Therapy No     Current therapy: Speech Therapy No     Current therapy: Other No     Current services: Home Health No, not needed     Quality of Life: What work now or before stroke   Employed full time   Quality of Life: Living situation before stroke With family or significant other     Quality of Life: Living situation now With family or significant other     Medication: How do you take your meds Myself, from a pillbox that I set up Myself, from individual bottles   Medication: Do you ever miss or forget meds Rarely     Risk Factor: Checking blood pressure at home No, I have a blood pressure cuff but do not use it Yes   Risk Factor: Usual blood pressure numbers   didnt write down   Risk Factor: Why not checking BP at home Hard for me to remember to check it     Risk Factor: Checking blood sugar at home No No   Risk Factor: Second-hand smoke  at home No No   Risk Factor: How much caffeine per day   coffee and tea once a day   Who completed this questionnaire?   The patient independently             Assessment and Plan         1. Right internal capsule and left periatrial white matter infarcts, likely due to small vessel disease   2. Multifocal chronic microhemorrhages, likely hypertensive, in the basal ganglia, thalami, and right hemipons   3. Scattered chronic lacunar infarcts (periventricular white matter, left basal ganglia, right thalamus)   4. Intracranial atherosclerosis   - aspirin 81 mg daily indefinitely for secondary stroke prevention   - atorvastatin 80 mg and ezetimibe 10 mg daily; LDL goal 40-70  - blood pressure goal 130/80  - previously referred to sleep clinic for positive sleep apnea screen; pt was provided with scheduling number   - follow up in stroke clinic in 6 months      Stroke Education provided.  He will call us with any questions.  For any acute neurologic deficits he was advised to  go directly to the hospital rather than call the clinic.        Kirti Mclaughlin NP  Neurology  03/12/2024            ____________________________________________________________________     Billing:     I spent a total of 30 minutes on the day of the visit.      Time spent by me doing chart review, history and exam, documentation and further activities per the note         Again, thank you for allowing me to participate in the care of your patient.        Sincerely,        Kirti Mclaughlin NP

## 2024-03-26 NOTE — PATIENT INSTRUCTIONS
- No medication changes today, continue aspirin 81 mg, atorvastatin 80 mg, ezetimibe 10 mg, and lisinopril 30 mg   - If the top number of your blood pressure is consistently higher than 130, please follow up with your primary care provider to discuss adjusting your blood pressure medication. Your goal blood pressure is 130/80 or less   - call [phone] to schedule a sleep clinic appointment     Stroke Clinic Phone Number: [phone]  Stroke Prevention Recommendations  High blood pressure, or hypertension, is a leading cause of stroke and the most significant controllable risk factor. You should aim to keep your blood pressure below 130/80.     Smoking cessation: The nicotine and carbon monoxide in cigarette smoke damage the cardiovascular system and pave the way for a stroke. If you use nicotine, please reach out to your primary care provider for assistance with quitting or consider utilizing one of [place]'s free quit support programs (https://www.[email or web address]./communities/tobacco/quitting/index.html)    If you have Type 1 or 2 diabetes, control you blood sugar. You should aim to keep your HGBA1C below 7.0.     Eat an overall health dietary pattern that emphasizes:  - a wide variety of fruits and vegetables   - whole grains and products made up of mostly whole grains   - healthy sources of protein (mostly plants such as legumes and nuts; fish and seafood; low-fat or non-fat dairy; and, if you eat meat and poultry, ensuring it is lean and unprocessed)  - liquid non-tropical vegetable oils  - minimally processed foods   - minimize intake of added sugars  - foods prepared with little or no salt  - limited or preferably no alcohol intake    Aim for being active at least 150 minutes a week, but if you don't want to sweat the numbers, just move more and sit less.    Excess body weight and obesity are linked with an increased risk of high blood pressure, diabetes, heart disease, and stroke. Losing as little as  5 to 10 pounds can make a significant difference in your risks.  [employer] [place] has a Comprehensive Weight Management program if you would like assistance/support: https://[email or web address]/treatments/comprehensive-weight-management    Large amounts of cholesterol in the blood can build up and cause blood clots - leading to a stroke. Aim to keep your LDL cholesterol between 40 and 70.     Recrudescence  Recrudescence of stroke symptoms refers to when a person experiences previously resolved symptoms of a stroke. These symptoms are usually mild and resolve within a few days. The condition is also sometimes referred to as ischemic stroke recrudescence. In simple terms, ischemic stroke recrudescence, or anamnestic syndrome, occurs when someone redevelops symptoms they experienced after having a stroke they had previously recovered from. This may result in the reappearance of neurological or physical symptoms associated with stroke. In most cases, people tend to experience a mild, rather than severe, worsening or return of symptoms. Recognized triggers of ischemic stroke recrudescence include:  - infections  - stress  - conditions that cause metabolic dysregulation, such as diabetes  - insomnia or lack of proper sleep  - hypertension (high blood pressure)  - low sodium levels (hyponatremia)  - use of sedating medications, such as benzodiazepines, and anesthetic drugs, such as midazolam hydrochloride and fentanyl citrate    Call 911 if these signs are present

## 2024-05-01 ENCOUNTER — ORDERS ONLY (AUTO-RELEASED) (OUTPATIENT)
Dept: NEUROLOGY | Facility: CLINIC | Age: 59
End: 2024-05-01
Payer: COMMERCIAL

## 2024-05-01 DIAGNOSIS — I63.9 ACUTE ISCHEMIC STROKE (H): Primary | ICD-10-CM

## 2024-05-01 DIAGNOSIS — I63.9 ACUTE ISCHEMIC STROKE (H): ICD-10-CM

## 2024-05-01 DIAGNOSIS — G45.9 TIA (TRANSIENT ISCHEMIC ATTACK): ICD-10-CM

## 2024-05-25 ENCOUNTER — APPOINTMENT (OUTPATIENT)
Dept: RADIOLOGY | Facility: HOSPITAL | Age: 59
End: 2024-05-25
Attending: EMERGENCY MEDICINE
Payer: COMMERCIAL

## 2024-05-25 ENCOUNTER — HOSPITAL ENCOUNTER (EMERGENCY)
Facility: HOSPITAL | Age: 59
Discharge: HOME | End: 2024-05-25
Attending: EMERGENCY MEDICINE | Admitting: EMERGENCY MEDICINE
Payer: COMMERCIAL

## 2024-05-25 VITALS
WEIGHT: 139.3 LBS | HEIGHT: 61 IN | HEART RATE: 65 BPM | TEMPERATURE: 97.4 F | OXYGEN SATURATION: 96 % | RESPIRATION RATE: 21 BRPM | SYSTOLIC BLOOD PRESSURE: 127 MMHG | DIASTOLIC BLOOD PRESSURE: 86 MMHG | BODY MASS INDEX: 26.3 KG/M2

## 2024-05-25 DIAGNOSIS — R07.9 NONSPECIFIC CHEST PAIN: ICD-10-CM

## 2024-05-25 LAB
ANION GAP SERPL CALCULATED.3IONS-SCNC: 10 MMOL/L (ref 7–15)
BASOPHILS # BLD AUTO: 0.1 10E3/UL (ref 0–0.2)
BASOPHILS NFR BLD AUTO: 1 %
BUN SERPL-MCNC: 26 MG/DL (ref 6–20)
CALCIUM SERPL-MCNC: 9.3 MG/DL (ref 8.6–10)
CHLORIDE SERPL-SCNC: 105 MMOL/L (ref 98–107)
CREAT SERPL-MCNC: 1.41 MG/DL (ref 0.67–1.17)
DEPRECATED HCO3 PLAS-SCNC: 23 MMOL/L (ref 22–29)
EGFRCR SERPLBLD CKD-EPI 2021: 58 ML/MIN/1.73M2
EOSINOPHIL # BLD AUTO: 0.1 10E3/UL (ref 0–0.7)
EOSINOPHIL NFR BLD AUTO: 1 %
ERYTHROCYTE [DISTWIDTH] IN BLOOD BY AUTOMATED COUNT: 12.7 % (ref 10–15)
FLUAV RNA SPEC QL NAA+PROBE: NEGATIVE
FLUBV RNA RESP QL NAA+PROBE: NEGATIVE
GLUCOSE SERPL-MCNC: 118 MG/DL (ref 70–99)
HCT VFR BLD AUTO: 48.3 % (ref 40–53)
HGB BLD-MCNC: 16.3 G/DL (ref 13.3–17.7)
HOLD SPECIMEN: NORMAL
IMM GRANULOCYTES # BLD: 0 10E3/UL
IMM GRANULOCYTES NFR BLD: 0 %
LYMPHOCYTES # BLD AUTO: 2 10E3/UL (ref 0.8–5.3)
LYMPHOCYTES NFR BLD AUTO: 26 %
MCH RBC QN AUTO: 30.2 PG (ref 26.5–33)
MCHC RBC AUTO-ENTMCNC: 33.7 G/DL (ref 31.5–36.5)
MCV RBC AUTO: 89 FL (ref 78–100)
MONOCYTES # BLD AUTO: 0.6 10E3/UL (ref 0–1.3)
MONOCYTES NFR BLD AUTO: 7 %
NEUTROPHILS # BLD AUTO: 4.9 10E3/UL (ref 1.6–8.3)
NEUTROPHILS NFR BLD AUTO: 65 %
NRBC # BLD AUTO: 0 10E3/UL
NRBC BLD AUTO-RTO: 0 /100
PLATELET # BLD AUTO: 200 10E3/UL (ref 150–450)
POTASSIUM SERPL-SCNC: 4.4 MMOL/L (ref 3.4–5.3)
RBC # BLD AUTO: 5.4 10E6/UL (ref 4.4–5.9)
RSV RNA SPEC NAA+PROBE: NEGATIVE
SARS-COV-2 RNA RESP QL NAA+PROBE: NEGATIVE
SODIUM SERPL-SCNC: 138 MMOL/L (ref 135–145)
TROPONIN T SERPL HS-MCNC: <6 NG/L
WBC # BLD AUTO: 7.5 10E3/UL (ref 4–11)

## 2024-05-25 PROCEDURE — 84484 ASSAY OF TROPONIN QUANT: CPT | Performed by: EMERGENCY MEDICINE

## 2024-05-25 PROCEDURE — 87637 SARSCOV2&INF A&B&RSV AMP PRB: CPT | Performed by: EMERGENCY MEDICINE

## 2024-05-25 PROCEDURE — 80048 BASIC METABOLIC PNL TOTAL CA: CPT | Performed by: EMERGENCY MEDICINE

## 2024-05-25 PROCEDURE — 36415 COLL VENOUS BLD VENIPUNCTURE: CPT | Performed by: EMERGENCY MEDICINE

## 2024-05-25 PROCEDURE — 250N000013 HC RX MED GY IP 250 OP 250 PS 637: Performed by: EMERGENCY MEDICINE

## 2024-05-25 PROCEDURE — 93005 ELECTROCARDIOGRAM TRACING: CPT | Performed by: EMERGENCY MEDICINE

## 2024-05-25 PROCEDURE — 85025 COMPLETE CBC W/AUTO DIFF WBC: CPT | Performed by: EMERGENCY MEDICINE

## 2024-05-25 PROCEDURE — 71045 X-RAY EXAM CHEST 1 VIEW: CPT

## 2024-05-25 PROCEDURE — 99285 EMERGENCY DEPT VISIT HI MDM: CPT | Mod: 25

## 2024-05-25 PROCEDURE — 250N000009 HC RX 250: Performed by: EMERGENCY MEDICINE

## 2024-05-25 RX ORDER — LIDOCAINE HYDROCHLORIDE 20 MG/ML
10 SOLUTION OROPHARYNGEAL ONCE
Status: COMPLETED | OUTPATIENT
Start: 2024-05-25 | End: 2024-05-25

## 2024-05-25 RX ORDER — MAGNESIUM HYDROXIDE/ALUMINUM HYDROXICE/SIMETHICONE 120; 1200; 1200 MG/30ML; MG/30ML; MG/30ML
15 SUSPENSION ORAL ONCE
Status: COMPLETED | OUTPATIENT
Start: 2024-05-25 | End: 2024-05-25

## 2024-05-25 RX ADMIN — ALUMINUM HYDROXIDE, MAGNESIUM HYDROXIDE, AND SIMETHICONE 15 ML: 1200; 120; 1200 SUSPENSION ORAL at 05:04

## 2024-05-25 RX ADMIN — LIDOCAINE HYDROCHLORIDE 10 ML: 20 SOLUTION ORAL; TOPICAL at 05:04

## 2024-05-25 ASSESSMENT — COLUMBIA-SUICIDE SEVERITY RATING SCALE - C-SSRS
2. HAVE YOU ACTUALLY HAD ANY THOUGHTS OF KILLING YOURSELF IN THE PAST MONTH?: NO
1. IN THE PAST MONTH, HAVE YOU WISHED YOU WERE DEAD OR WISHED YOU COULD GO TO SLEEP AND NOT WAKE UP?: NO

## 2024-05-25 ASSESSMENT — ACTIVITIES OF DAILY LIVING (ADL)
ADLS_ACUITY_SCORE: 36
ADLS_ACUITY_SCORE: 36

## 2024-05-25 NOTE — ED PROVIDER NOTES
EMERGENCY DEPARTMENT ENCOUNTER      NAME: Fredy Garza  AGE: 59 year old male  YOB: 1965  MRN: [id number]  EVALUATION DATE & TIME: 2024  4:43 AM    PCP: Zain Roca    ED PROVIDER: Eugenio Douglas D.O.      Chief Complaint   Patient presents with    Chest Pain       FINAL IMPRESSION:  1. Nonspecific chest pain        ED COURSE & MEDICAL DECISION MAKIN:47 AM I met with the patient to gather history and to perform my initial exam. I discussed the plan for care while in the Emergency Department.  6:14 AM patient reports significant improvement after GI cocktail.  Comfortable with discharge home and outpatient Cardiology follow up         Pertinent Labs & Imaging studies reviewed. (See chart for details)  59 year old male presents to the Emergency Department for evaluation of right-sided chest pain.  Patient reports no exertional symptoms, actually improves when he is moving around.  Denied any shortness of breath.  Initial differential did include ACS, PE, dissection, pneumothorax, reflux, other acute process.  Patient is not tachycardic or hypoxic, otherwise PERC negative, doubt PE.  Chest x-ray did not show any evidence of mediastinal widening suggestive of dissection, pneumothorax, or consolidation suggestive of pneumonia.  EKG did not show any evidence of ischemia arrhythmia, first troponin was less than 6, do not believe a second troponin is indicated, and unlikely especially as the patient symptoms improved with exertion.  He did have improvement in the emergency department with a GI cocktail, raising suspicion that this could be reflux.  However with his known underlying risk factors, and age, I did decide to refer to rapid access clinic for further management.    Medical Decision Making  Obtained supplemental history:Supplemental history obtained?: Documented in chart and Family Member/Significant Other  Reviewed external records: External records reviewed?: Documented in chart  Care  impacted by chronic illness:Cerebrovascular Disease, Chronic Kidney Disease, Hyperlipidemia, and Hypertension  Care significantly affected by social determinants of health:N/A  Did you consider but not order tests?: Work up considered but not performed and documented in chart, if applicable  Did you interpret images independently?: Independent interpretation of ECG and images noted in documentation, when applicable.  Consultation discussion with other provider:Did you involve another provider (consultant, , pharmacy, etc.)?: No  Discharge. I prescribed additional prescription strength medication(s) as charted. See documentation for any additional details.    At the conclusion of the encounter I discussed the results of all of the tests and the disposition. The questions were answered. The patient or family acknowledged understanding and was agreeable with the care plan.        HPI    Patient information was obtained from: Patient and wife    Use of : N/A       Fredy Garza is a 59 year old male who presents for evaluation of chest pain.     The patient reports that for the last 2 days, he has been experiencing right-sided chest pain that has progressively worsened, preventing him from sleeping tonight. He notes that his pain is relieved with ambulating. Patient also endorses a slight intermittent subjective fever since yesterday, with no recorded home temperatures.     Denies shortness of breath, lightheadedness, cough, congestion, sore throat, or any other concerns at this time. He mentions a PMHx of TIA, hyperlipidemia, and hypertension.     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       REVIEW OF SYSTEMS  Constitutional:  Positive for subjective fever, Denies chills, weight loss or weakness  Eyes:  No pain, discharge,  redness  HENT:  Denies sore throat, ear pain, congestion  Respiratory: No SOB, wheeze or cough  Cardiovascular:  Positive for chest pain, No palpitations  GI:  Denies abdominal pain, nausea, vomiting, diarrhea  : Denies dysuria, hematuria  Musculoskeletal:  Denies any new muscle/joint pain, swelling or loss of function.  Skin:  Denies rash, pallor  Neurologic:  Denies headache, focal weakness or sensory changes  Lymph: Denies swollen nodes    All other systems negative unless noted in HPI.    PAST MEDICAL HISTORY:  Past Medical History:   Diagnosis Date    HTN (hypertension)     Hyperlipemia        PAST SURGICAL HISTORY:  Past Surgical History:   Procedure Laterality Date    OTHER SURGICAL HISTORY      no previous         CURRENT MEDICATIONS:    No current facility-administered medications for this encounter.     Current Outpatient Medications   Medication Sig Dispense Refill    omeprazole (PRILOSEC) 20 MG DR capsule Take 1 capsule (20 mg) by mouth daily 30 capsule 0    aspirin 81 MG EC tablet Take 1 tablet (81 mg) by mouth daily 30 tablet 11    atorvastatin (LIPITOR) 80 MG tablet Take 1 tablet (80 mg) by mouth at bedtime 90 tablet 3    ezetimibe (ZETIA) 10 MG tablet Take 1 tablet (10 mg) by mouth at bedtime 30 tablet 3    lisinopril (ZESTRIL) 30 MG tablet Take 1 tablet (30 mg) by mouth at bedtime 90 tablet 3         ALLERGIES:  No Known Allergies    FAMILY HISTORY:  Family History   Problem Relation Age of Onset    Stomach Cancer Mother        SOCIAL HISTORY:  Social History     Socioeconomic History    Marital status:    Tobacco Use    Smoking status: Never   Vaping Use    Vaping status: Never Used   Substance and Sexual Activity    Alcohol use: No     Social Determinants of Health     Financial Resource Strain: Low Risk  (2/6/2023)    [facility], [facility]    Financial Resource Strain     Difficulty of Paying Living  "Expenses: 3   Food Insecurity: No Food Insecurity (2/6/2023)    Received from [facility], [facility]    Food Insecurity     Worried About Running Out of Food in the Last Year: 1   Transportation Needs: No Transportation Needs (2/6/2023)    [facility], [facility]    Transportation Needs     Lack of Transportation (Medical): 1    Received [facility], [facility]    Social Connections   Housing Stability: Low Risk  (2/6/2023)    [facility], [facility]    Housing Stability     Unable to Pay for Housing in the Last Year: 1       VITALS:  Patient Vitals for the past 24 hrs:   BP Temp Temp src Pulse Resp SpO2 Height Weight   05/25/24 0549 127/86 -- -- 67 18 96 % -- --   05/25/24 0529 (!) 145/96 -- -- 60 15 96 % -- --   05/25/24 0516 (!) 137/93 -- -- 65 18 96 % -- --   05/25/24 0505 (!) 150/90 -- -- 64 21 96 % -- --   05/25/24 0454 136/89 -- -- 66 19 96 % -- --   05/25/24 0439 (!) 175/96 97.4  F (36.3  C) Temporal 66 18 96 % 1.549 m (5' 1\") 63.2 kg (139 lb 4.8 oz)       PHYSICAL EXAM    VITAL SIGNS: /86   Pulse 67   Temp 97.4  F (36.3  C) (Temporal)   Resp 18   Ht 1.549 m (5' 1\")   Wt 63.2 kg (139 lb 4.8 oz)   SpO2 96%   BMI 26.32 kg/m      General Appearance: Well-appearing, well-nourished, no acute distress   Head:  Normocephalic, without obvious abnormality, atraumatic  Eyes:  PERRL, conjunctiva/corneas clear, EOM's intact,  ENT:  Lips, mucosa, and tongue normal, membranes are moist without pallor  Neck:  Normal ROM, symmetrical, trachea midline    Chest:  No tenderness or deformity, no crepitus  Cardio:  Regular rate and rhythm, no murmur, rub or gallop, 2+ pulses symmetric in all extremities  Pulm:  Clear to " auscultation bilaterally, respirations unlabored  Abdomen:  Soft, non-tender, no rebound or guarding.  Musculoskeletal: Full ROM, no edema, no cyanosis, good ROM of major joints  Integument:  Warm, Dry, No erythema, No rash.    Neurologic:  Alert & oriented.  No focal deficits appreciated.  Ambulatory.  Psychiatric:  Affect normal, Judgment normal, Mood normal.      LABS  Results for orders placed or performed during the hospital encounter of 05/25/24 (from the past 24 hour(s))   CBC with platelets + differential    Narrative    The following orders were created for panel order CBC with platelets + differential.  Procedure                               Abnormality         Status                     ---------                               -----------         ------                     CBC with platelets and d...[[id number]]                      Final result                 Please view results for these tests on the individual orders.   Basic metabolic panel   Result Value Ref Range    Sodium 138 135 - 145 mmol/L    Potassium 4.4 3.4 - 5.3 mmol/L    Chloride 105 98 - 107 mmol/L    Carbon Dioxide (CO2) 23 22 - 29 mmol/L    Anion Gap 10 7 - 15 mmol/L    Urea Nitrogen 26.0 (H) 6.0 - 20.0 mg/dL    Creatinine 1.41 (H) 0.67 - 1.17 mg/dL    GFR Estimate 58 (L) >60 mL/min/1.73m2    Calcium 9.3 8.6 - 10.0 mg/dL    Glucose 118 (H) 70 - 99 mg/dL   Troponin T, High Sensitivity (now)   Result Value Ref Range    Troponin T, High Sensitivity <6 <=22 ng/L   CBC with platelets and differential   Result Value Ref Range    WBC Count 7.5 4.0 - 11.0 10e3/uL    RBC Count 5.40 4.40 - 5.90 10e6/uL    Hemoglobin 16.3 13.3 - 17.7 g/dL    Hematocrit 48.3 40.0 - 53.0 %    MCV 89 78 - 100 fL    MCH 30.2 26.5 - 33.0 pg    MCHC 33.7 31.5 - 36.5 g/dL    RDW 12.7 10.0 - 15.0 %    Platelet Count 200 150 - 450 10e3/uL    % Neutrophils 65 %    % Lymphocytes 26 %    % Monocytes 7 %    % Eosinophils 1 %    % Basophils 1 %    % Immature Granulocytes 0 %     NRBCs per 100 WBC 0 <1 /100    Absolute Neutrophils 4.9 1.6 - 8.3 10e3/uL    Absolute Lymphocytes 2.0 0.8 - 5.3 10e3/uL    Absolute Monocytes 0.6 0.0 - 1.3 10e3/uL    Absolute Eosinophils 0.1 0.0 - 0.7 10e3/uL    Absolute Basophils 0.1 0.0 - 0.2 10e3/uL    Absolute Immature Granulocytes 0.0 <=0.4 10e3/uL    Absolute NRBCs 0.0 10e3/uL   Extra Tube    Narrative    The following orders were created for panel order Extra Tube.  Procedure                               Abnormality         Status                     ---------                               -----------         ------                     Extra Blue Top Tube[[id number]]                              In process                   Please view results for these tests on the individual orders.   Symptomatic Influenza A/B, RSV, & SARS-CoV2 PCR (COVID-19) Nasopharyngeal    Specimen: Nasopharyngeal; Swab   Result Value Ref Range    Influenza A PCR Negative Negative    Influenza B PCR Negative Negative    RSV PCR Negative Negative    SARS CoV2 PCR Negative Negative    Narrative    Testing was performed using the Xpert Xpress CoV2/Flu/RSV Assay on the Cepheid GeneXpert Instrument. This test should be ordered for the detection of SARS-CoV-2, influenza, and RSV viruses in individuals who meet clinical and/or epidemiological criteria. Test performance is unknown in asymptomatic patients. This test is for in vitro diagnostic use under the FDA EUA for laboratories certified under CLIA to perform high or moderate complexity testing. This test has not been FDA cleared or approved. A negative result does not rule out the presence of PCR inhibitors in the specimen or target RNA in concentration below the limit of detection for the assay. If only one viral target is positive but coinfection with multiple targets is suspected, the sample should be re-tested with another FDA cleared, approved, or authorized test, if coinfection would change clinical management. This test was  validated by the [facility] and [facility]. These laboratories are certified under the Clinical Laboratory Improvement Amendments of 1988 (CLIA-88) as qualified to perform high complexity laboratory testing.   XR Chest Port 1 View    Narrative    EXAM: XR CHEST PORTABLE 1 VIEW  LOCATION: [facility]  DATE: 05/25/2024    INDICATION: Chest pain.  COMPARISON: Chest x-ray on 11/08/2023.      Impression    IMPRESSION: Single AP view of the chest was obtained. Cardiomediastinal silhouette is within normal limits. No suspicious focal pulmonary opacities. No significant pleural effusion or pneumothorax.             RADIOLOGY  XR Chest Port 1 View   Final Result   IMPRESSION: Single AP view of the chest was obtained. Cardiomediastinal silhouette is within normal limits. No suspicious focal pulmonary opacities. No significant pleural effusion or pneumothorax.                   EKG:    Rate: 65 bpm  Rhythm: Normal Sinus Rhythm  Axis: Normal  Interval: Normal  Conduction: Normal  QRS: Normal  ST: Normal  T-wave: Inverted V5, V6  QT: Not prolonged  Comparison EKG: Other than T wave inversions in V5 and V6, no significant change compared to 8 November 2023  Impression:  No acute ischemic change   I have independently reviewed and interpreted today's EKG, pending Cardiologist read          MEDICATIONS GIVEN IN THE EMERGENCY:  Medications   alum & mag hydroxide-simethicone (MAALOX) suspension 15 mL (15 mLs Oral $Given 5/25/24 0504)   lidocaine (viscous) (XYLOCAINE) 2 % solution 10 mL (10 mLs Mouth/Throat $Given 5/25/24 0504)       NEW PRESCRIPTIONS STARTED AT TODAY'S ER VISIT  New Prescriptions    OMEPRAZOLE (PRILOSEC) 20 MG DR CAPSULE    Take 1 capsule (20 mg) by mouth daily        I, Jessica Leal, am serving as a scribe to document services personally performed by Eugenio Douglas D.O., based on my observations and the provider's statements to me.  I, Eugenio Douglas D.O., attest that Jessica Leal is  acting in a scribe capacity, has observed my performance of the services and has documented them in accordance with my direction.     Eugenio Douglas D.O.  Emergency Medicine  [facility] EMERGENCY DEPARTMENT  [address] [place] [id number] [phone]  Dept: [phone]       Eugenio Douglas,   05/25/24 0621

## 2024-05-25 NOTE — ED TRIAGE NOTES
"Pt presents with right anterior chest pain that began 2 days ago. Pt states if improves with activity. Unable to states what kind of pain it is, just \"hurts.\" Denies SOB, nausea, or vomiting.     Triage Assessment (Adult)       Row Name 05/25/24 0440          Triage Assessment    Airway WDL WDL        Respiratory WDL    Respiratory WDL WDL        Skin Circulation/Temperature WDL    Skin Circulation/Temperature WDL WDL        Cardiac WDL    Cardiac WDL X;chest pain        Chest Pain Assessment    Chest Pain Location anterior chest, right     Chest Pain Radiation shoulder     Precipitating Factors nothing        Peripheral/Neurovascular WDL    Peripheral Neurovascular WDL WDL        Cognitive/Neuro/Behavioral WDL    Cognitive/Neuro/Behavioral WDL WDL                     "

## 2024-05-26 LAB
ATRIAL RATE - MUSE: 65 BPM
DIASTOLIC BLOOD PRESSURE - MUSE: NORMAL MMHG
INTERPRETATION ECG - MUSE: NORMAL
P AXIS - MUSE: 56 DEGREES
PR INTERVAL - MUSE: 190 MS
QRS DURATION - MUSE: 100 MS
QT - MUSE: 406 MS
QTC - MUSE: 422 MS
R AXIS - MUSE: -29 DEGREES
SYSTOLIC BLOOD PRESSURE - MUSE: NORMAL MMHG
T AXIS - MUSE: -1 DEGREES
VENTRICULAR RATE- MUSE: 65 BPM

## 2024-10-04 ENCOUNTER — TELEPHONE (OUTPATIENT)
Dept: NEUROLOGY | Facility: CLINIC | Age: 59
End: 2024-10-04
Payer: COMMERCIAL

## 2024-10-11 NOTE — TELEPHONE ENCOUNTER
2nd attempt to schedule [facility] visit- voicemail was full, writer not able to leave a message. Sent My chart message

## 2025-03-16 ENCOUNTER — HEALTH MAINTENANCE LETTER (OUTPATIENT)
Age: 60
End: 2025-03-16